# Patient Record
Sex: FEMALE | Race: WHITE | NOT HISPANIC OR LATINO | Employment: UNEMPLOYED | URBAN - NONMETROPOLITAN AREA
[De-identification: names, ages, dates, MRNs, and addresses within clinical notes are randomized per-mention and may not be internally consistent; named-entity substitution may affect disease eponyms.]

---

## 2018-06-12 ENCOUNTER — OFFICE VISIT (OUTPATIENT)
Dept: UROLOGY | Facility: CLINIC | Age: 68
End: 2018-06-12

## 2018-06-12 VITALS — HEIGHT: 63 IN | WEIGHT: 155 LBS | BODY MASS INDEX: 27.46 KG/M2

## 2018-06-12 DIAGNOSIS — N20.0 RENAL CALCULUS: Primary | ICD-10-CM

## 2018-06-12 PROCEDURE — 99204 OFFICE O/P NEW MOD 45 MIN: CPT | Performed by: UROLOGY

## 2018-06-12 RX ORDER — CETIRIZINE HYDROCHLORIDE 10 MG/1
10 TABLET ORAL DAILY
COMMUNITY

## 2018-06-12 RX ORDER — ATORVASTATIN CALCIUM 20 MG/1
40 TABLET, FILM COATED ORAL DAILY
Status: ON HOLD | COMMUNITY
End: 2020-06-08

## 2018-06-12 RX ORDER — PANTOPRAZOLE SODIUM 40 MG/1
40 TABLET, DELAYED RELEASE ORAL DAILY
COMMUNITY

## 2018-06-12 RX ORDER — RIZATRIPTAN BENZOATE 10 MG/1
10 TABLET ORAL ONCE AS NEEDED
COMMUNITY

## 2018-06-12 RX ORDER — MONTELUKAST SODIUM 10 MG/1
10 TABLET ORAL DAILY
COMMUNITY

## 2018-06-12 NOTE — PROGRESS NOTES
Chief Complaint:          Chief Complaint   Patient presents with   • Nephrolithiasis       HPI:   67 y.o. female.  67-year-old white female referred with bilateral stones seen on CT done for gastroesophageal reflux she is absolutely asymptomatic there was a positive family history.  She has apparently an elevated creatinine but it was not included.  She's having no other complaints or any other symptomatology referable to this problem.    Past Medical History:        Past Medical History:   Diagnosis Date   • Breast cancer    • H/O seasonal allergies    • High cholesterol    • Migraines    • Osteoarthritis          Current Meds:     Current Outpatient Prescriptions   Medication Sig Dispense Refill   • Alendronate Sodium (FOSAMAX PO) Take  by mouth.     • atorvastatin (LIPITOR) 20 MG tablet Take 20 mg by mouth Daily.     • CALCIUM-VITAMIN D PO Take  by mouth.     • cetirizine (zyrTEC) 10 MG tablet Take 10 mg by mouth Daily.     • DICLOFENAC SODIUM PO Take  by mouth.     • montelukast (SINGULAIR) 10 MG tablet Take 10 mg by mouth Every Night.     • pantoprazole (PROTONIX) 40 MG EC tablet Take 40 mg by mouth Daily.     • rizatriptan (MAXALT) 10 MG tablet Take 10 mg by mouth 1 (One) Time As Needed for Migraine. May repeat in 2 hours if needed     • SITagliptin (JANUVIA) 100 MG tablet Take 100 mg by mouth Daily.       No current facility-administered medications for this visit.         Allergies:      No Known Allergies     Past Surgical History:     Past Surgical History:   Procedure Laterality Date   • BREAST LUMPECTOMY     • GALLBLADDER SURGERY     • TUBAL ABDOMINAL LIGATION           Social History:     Social History     Social History   • Marital status:      Spouse name: N/A   • Number of children: N/A   • Years of education: N/A     Occupational History   • Not on file.     Social History Main Topics   • Smoking status: Former Smoker   • Smokeless tobacco: Never Used   • Alcohol use No   • Drug use: No   •  Sexual activity: Not on file     Other Topics Concern   • Not on file     Social History Narrative   • No narrative on file       Family History:     Family History   Problem Relation Age of Onset   • Heart disease Father    • Cancer Mother        Review of Systems:     Review of Systems   Constitutional: Negative.  Negative for activity change, appetite change, chills, diaphoresis, fatigue and unexpected weight change.   HENT: Negative for congestion, dental problem, drooling, ear discharge, ear pain, facial swelling, hearing loss, mouth sores, nosebleeds, postnasal drip, rhinorrhea, sinus pressure, sneezing, sore throat, tinnitus, trouble swallowing and voice change.    Eyes: Negative.  Negative for photophobia, pain, discharge, redness, itching and visual disturbance.   Respiratory: Negative.  Negative for apnea, cough, choking, chest tightness, shortness of breath, wheezing and stridor.    Cardiovascular: Negative.  Negative for chest pain, palpitations and leg swelling.   Gastrointestinal: Negative.  Negative for abdominal distention, abdominal pain, anal bleeding, blood in stool, constipation, diarrhea, nausea, rectal pain and vomiting.   Endocrine: Negative.  Negative for cold intolerance, heat intolerance, polydipsia, polyphagia and polyuria.   Musculoskeletal: Negative.  Negative for arthralgias, back pain, gait problem, joint swelling, myalgias, neck pain and neck stiffness.   Skin: Negative.  Negative for color change, pallor, rash and wound.   Allergic/Immunologic: Negative.  Negative for environmental allergies, food allergies and immunocompromised state.   Neurological: Negative.  Negative for dizziness, tremors, seizures, syncope, facial asymmetry, speech difficulty, weakness, light-headedness, numbness and headaches.   Hematological: Negative.  Negative for adenopathy. Does not bruise/bleed easily.   Psychiatric/Behavioral: Negative for agitation, behavioral problems, confusion, decreased  concentration, dysphoric mood, hallucinations, self-injury, sleep disturbance and suicidal ideas. The patient is not nervous/anxious and is not hyperactive.    All other systems reviewed and are negative.      Physical Exam:     Physical Exam   Constitutional: She appears well-developed and well-nourished.   HENT:   Head: Normocephalic and atraumatic.   Right Ear: External ear normal.   Left Ear: External ear normal.   Mouth/Throat: Oropharynx is clear and moist.   Eyes: Conjunctivae are normal. Pupils are equal, round, and reactive to light.   Cardiovascular: Normal rate, regular rhythm, normal heart sounds and intact distal pulses.    Pulmonary/Chest: Effort normal and breath sounds normal.   Abdominal: Soft. Bowel sounds are normal. She exhibits no distension and no mass. There is no tenderness. There is no rebound and no guarding.   Genitourinary: No vaginal discharge found.   Musculoskeletal: Normal range of motion.   Neurological: She is alert. She has normal reflexes.   Skin: Skin is warm and dry.   Psychiatric: She has a normal mood and affect. Her behavior is normal. Judgment and thought content normal.       I have reviewed the following portions of the patient's history: allergies, current medications, past family history, past medical history, past social history, past surgical history, problem list and ROS and confirm it's accurate.      Procedure:       Assessment/Plan:   Renal calculus-we discussed the presence of the stone we discussed the various therapeutic options available including percutaneous nephrostolithotomy, lithotripsy.  We discussed the risks of lithotripsy including the passage of stones the development of a large string of stones in the distal ureter known as Steinstrasse.  In the 3% incidence of that we will need to proceed with a ureteroscopy for obstructing fragments.  Extremely rare incidence of renal hematoma.  And the significance of this.  We discussed the absolute relative  indicators for intervention including the presence of sepsis, and pain we cannot control is the primary need for urgent intervention.  We discussed placement of a stent if indicated and the management of the stent as well.  It is a moderate stone burden but absolutely no indicators for intervention.  I'm do recommend observation with a KUB to determine the metabolic activity apparently there is a history of elevated creatinine but I have no documentation of this and I'll follow-up with him based on this     Patient's Body mass index is 27.46 kg/m². BMI is above normal parameters. Recommendations include: educational material.          This document has been electronically signed by NADER MOORE MD June 12, 2018 11:03 AM

## 2018-06-14 PROBLEM — N20.0 RENAL CALCULUS: Status: ACTIVE | Noted: 2018-06-14

## 2019-04-09 ENCOUNTER — HOSPITAL ENCOUNTER (OUTPATIENT)
Dept: GENERAL RADIOLOGY | Facility: HOSPITAL | Age: 69
Discharge: HOME | End: 2019-04-09
Admitting: UROLOGY

## 2019-04-09 ENCOUNTER — OFFICE VISIT (OUTPATIENT)
Dept: UROLOGY | Facility: CLINIC | Age: 69
End: 2019-04-09

## 2019-04-09 VITALS — BODY MASS INDEX: 27.46 KG/M2 | WEIGHT: 155 LBS | HEIGHT: 63 IN

## 2019-04-09 DIAGNOSIS — N20.0 RENAL CALCULUS: Primary | ICD-10-CM

## 2019-04-09 DIAGNOSIS — N20.0 RENAL CALCULUS: ICD-10-CM

## 2019-04-09 PROCEDURE — 74018 RADEX ABDOMEN 1 VIEW: CPT

## 2019-04-09 PROCEDURE — 74018 RADEX ABDOMEN 1 VIEW: CPT | Performed by: RADIOLOGY

## 2019-04-09 PROCEDURE — 99213 OFFICE O/P EST LOW 20 MIN: CPT | Performed by: UROLOGY

## 2019-04-09 NOTE — PROGRESS NOTES
Chief Complaint:          Chief Complaint   Patient presents with   • Nephrolithiasis     f/u       HPI:   68 y.o. female  referred with bilateral stones seen on CT done for gastroesophageal reflux she is absolutely asymptomatic there was a positive family history.  She has apparently an elevated creatinine but it was not included.  She's having no other complaints or any other symptomatology referable to this problem.  He returns today she feels great she is doing well she has no chills no fever she has bilateral dull aches right greater than left I would get a repeat CT scan as it has been over a year and will redefine this for her and I will follow-up with her based on this.  Obviously, she had no pain but now she is having significant flank pain and I think it is important to rule her in or out active stone disease      Past Medical History:        Past Medical History:   Diagnosis Date   • Breast cancer (CMS/HCC)    • H/O seasonal allergies    • High cholesterol    • Migraines    • Osteoarthritis          Current Meds:     Current Outpatient Medications   Medication Sig Dispense Refill   • Alendronate Sodium (FOSAMAX PO) Take  by mouth.     • atorvastatin (LIPITOR) 20 MG tablet Take 20 mg by mouth Daily.     • CALCIUM-VITAMIN D PO Take  by mouth.     • cetirizine (zyrTEC) 10 MG tablet Take 10 mg by mouth Daily.     • DICLOFENAC SODIUM PO Take  by mouth.     • montelukast (SINGULAIR) 10 MG tablet Take 10 mg by mouth Every Night.     • pantoprazole (PROTONIX) 40 MG EC tablet Take 40 mg by mouth Daily.     • rizatriptan (MAXALT) 10 MG tablet Take 10 mg by mouth 1 (One) Time As Needed for Migraine. May repeat in 2 hours if needed     • SITagliptin (JANUVIA) 100 MG tablet Take 100 mg by mouth Daily.       No current facility-administered medications for this visit.         Allergies:      No Known Allergies     Past Surgical History:     Past Surgical History:   Procedure Laterality Date   • BREAST LUMPECTOMY     •  GALLBLADDER SURGERY     • TUBAL ABDOMINAL LIGATION           Social History:     Social History     Socioeconomic History   • Marital status:      Spouse name: Not on file   • Number of children: Not on file   • Years of education: Not on file   • Highest education level: Not on file   Tobacco Use   • Smoking status: Former Smoker   • Smokeless tobacco: Never Used   Substance and Sexual Activity   • Alcohol use: No   • Drug use: No       Family History:     Family History   Problem Relation Age of Onset   • Heart disease Father    • Cancer Mother        Review of Systems:     Review of Systems   Constitutional: Negative.  Negative for activity change, appetite change, chills, diaphoresis, fatigue and unexpected weight change.   HENT: Negative for congestion, dental problem, drooling, ear discharge, ear pain, facial swelling, hearing loss, mouth sores, nosebleeds, postnasal drip, rhinorrhea, sinus pressure, sneezing, sore throat, tinnitus, trouble swallowing and voice change.    Eyes: Negative.  Negative for photophobia, pain, discharge, redness, itching and visual disturbance.   Respiratory: Negative.  Negative for apnea, cough, choking, chest tightness, shortness of breath, wheezing and stridor.    Cardiovascular: Negative.  Negative for chest pain, palpitations and leg swelling.   Gastrointestinal: Negative.  Negative for abdominal distention, abdominal pain, anal bleeding, blood in stool, constipation, diarrhea, nausea, rectal pain and vomiting.   Endocrine: Negative.  Negative for cold intolerance, heat intolerance, polydipsia, polyphagia and polyuria.   Genitourinary: Positive for flank pain.   Musculoskeletal: Negative for arthralgias, back pain, gait problem, joint swelling, myalgias, neck pain and neck stiffness.   Skin: Negative.  Negative for color change, pallor, rash and wound.   Allergic/Immunologic: Negative.  Negative for environmental allergies, food allergies and immunocompromised state.    Neurological: Negative.  Negative for dizziness, tremors, seizures, syncope, facial asymmetry, speech difficulty, weakness, light-headedness, numbness and headaches.   Hematological: Negative.  Negative for adenopathy. Does not bruise/bleed easily.   Psychiatric/Behavioral: Negative for agitation, behavioral problems, confusion, decreased concentration, dysphoric mood, hallucinations, self-injury, sleep disturbance and suicidal ideas. The patient is not nervous/anxious and is not hyperactive.    All other systems reviewed and are negative.      Physical Exam:     Physical Exam   Constitutional: She appears well-developed and well-nourished.   HENT:   Head: Normocephalic and atraumatic.   Right Ear: External ear normal.   Left Ear: External ear normal.   Mouth/Throat: Oropharynx is clear and moist.   Eyes: Conjunctivae are normal. Pupils are equal, round, and reactive to light.   Cardiovascular: Normal rate, regular rhythm, normal heart sounds and intact distal pulses.   Pulmonary/Chest: Effort normal and breath sounds normal.   Abdominal: Soft. Bowel sounds are normal. She exhibits no distension and no mass. There is no tenderness. There is no rebound and no guarding.   Genitourinary: No vaginal discharge found.   Musculoskeletal: Normal range of motion.   Neurological: She is alert. She has normal reflexes.   Skin: Skin is warm and dry.   Psychiatric: She has a normal mood and affect. Her behavior is normal. Judgment and thought content normal.       I have reviewed the following portions of the patient's history: allergies, current medications, past family history, past medical history, past social history, past surgical history, problem list and ROS and confirm it's accurate.      Procedure:       Assessment/Plan:   Renal calculus-we discussed the presence of the stone we discussed the various therapeutic options available including percutaneous nephrostolithotomy, lithotripsy.  We discussed the risks of  lithotripsy including the passage of stones the development of a large string of stones in the distal ureter known as Steinstrasse.  In the 3% incidence of that we will need to proceed with a ureteroscopy for obstructing fragments.  Extremely rare incidence of renal hematoma.  And the significance of this.  We discussed the absolute relative indicators for intervention including the presence of sepsis, and pain we cannot control is the primary need for urgent intervention.  We discussed placement of a stent if indicated and the management of the stent as well.  Would repeat the CT scan this is been over a year and she is now having significant symptomatology    Patient's Body mass index is 27.46 kg/m². BMI is above normal parameters. Recommendations include: educational material.          This document has been electronically signed by NADER MOORE MD April 9, 2019 3:14 PM

## 2019-04-15 ENCOUNTER — HOSPITAL ENCOUNTER (OUTPATIENT)
Dept: CT IMAGING | Facility: HOSPITAL | Age: 69
Discharge: HOME | End: 2019-04-15

## 2019-04-15 DIAGNOSIS — N20.0 RENAL CALCULUS: ICD-10-CM

## 2019-04-22 ENCOUNTER — HOSPITAL ENCOUNTER (OUTPATIENT)
Dept: CT IMAGING | Facility: HOSPITAL | Age: 69
Discharge: HOME | End: 2019-04-22
Admitting: UROLOGY

## 2019-04-22 PROCEDURE — 74176 CT ABD & PELVIS W/O CONTRAST: CPT

## 2019-04-22 PROCEDURE — 74176 CT ABD & PELVIS W/O CONTRAST: CPT | Performed by: RADIOLOGY

## 2019-04-23 ENCOUNTER — OFFICE VISIT (OUTPATIENT)
Dept: UROLOGY | Facility: CLINIC | Age: 69
End: 2019-04-23

## 2019-04-23 VITALS — HEIGHT: 63 IN | BODY MASS INDEX: 27.46 KG/M2 | WEIGHT: 155 LBS

## 2019-04-23 DIAGNOSIS — N20.0 RENAL CALCULUS: Primary | ICD-10-CM

## 2019-04-23 PROCEDURE — 99213 OFFICE O/P EST LOW 20 MIN: CPT | Performed by: UROLOGY

## 2019-04-23 RX ORDER — GENTAMICIN SULFATE 80 MG/100ML
80 INJECTION, SOLUTION INTRAVENOUS ONCE
Status: CANCELLED | OUTPATIENT
Start: 2019-04-23 | End: 2019-04-23

## 2019-04-23 NOTE — PROGRESS NOTES
Chief Complaint:          Chief Complaint   Patient presents with   • Nephrolithiasis     f/u review CT       HPI:   68 y.o. female.  68-year-old white female with a significant stone burden and a proximal right sided stone I am to set her up for appropriate lithotripsy.  Renal calculus-we discussed the presence of the stone we discussed the various therapeutic options available including percutaneous nephrostolithotomy, lithotripsy.  We discussed the risks of lithotripsy including the passage of stones the development of a large string of stones in the distal ureter known as Steinstrasse.  In the 3% incidence of that we will need to proceed with a ureteroscopy for obstructing fragments.  Extremely rare incidence of renal hematoma.  And the significance of this.  We discussed the absolute relative indicators for intervention including the presence of sepsis, and pain we cannot control is the primary need for urgent intervention.  We discussed placement of a stent if indicated and the management of the stent as well.  ESWL-the patient is a candidate for extracorporeal shockwave lithotripsy.  We discussed the type of stone.  And the complications associated with the procedure including but not limited to pain in the flank, hematoma, spontaneous renal hemorrhage, and adequate fragmentation of stones.  The need for passage of the stones.  The need for concomitant additional procedures in the range of 24% the risk of a distal fragment in the range of 3% requiring ureteroscopic removal..       Past Medical History:        Past Medical History:   Diagnosis Date   • Breast cancer (CMS/HCC)    • H/O seasonal allergies    • High cholesterol    • Migraines    • Osteoarthritis          Current Meds:     Current Outpatient Medications   Medication Sig Dispense Refill   • Alendronate Sodium (FOSAMAX PO) Take  by mouth.     • atorvastatin (LIPITOR) 20 MG tablet Take 20 mg by mouth Daily.     • CALCIUM-VITAMIN D PO Take  by mouth.      • cetirizine (zyrTEC) 10 MG tablet Take 10 mg by mouth Daily.     • DICLOFENAC SODIUM PO Take  by mouth.     • montelukast (SINGULAIR) 10 MG tablet Take 10 mg by mouth Every Night.     • pantoprazole (PROTONIX) 40 MG EC tablet Take 40 mg by mouth Daily.     • rizatriptan (MAXALT) 10 MG tablet Take 10 mg by mouth 1 (One) Time As Needed for Migraine. May repeat in 2 hours if needed     • SITagliptin (JANUVIA) 100 MG tablet Take 100 mg by mouth Daily.       No current facility-administered medications for this visit.         Allergies:      No Known Allergies     Past Surgical History:     Past Surgical History:   Procedure Laterality Date   • BREAST LUMPECTOMY     • GALLBLADDER SURGERY     • TUBAL ABDOMINAL LIGATION           Social History:     Social History     Socioeconomic History   • Marital status:      Spouse name: Not on file   • Number of children: Not on file   • Years of education: Not on file   • Highest education level: Not on file   Tobacco Use   • Smoking status: Former Smoker   • Smokeless tobacco: Never Used   Substance and Sexual Activity   • Alcohol use: No   • Drug use: No       Family History:     Family History   Problem Relation Age of Onset   • Heart disease Father    • Cancer Mother        Review of Systems:     Review of Systems   Constitutional: Negative.  Negative for activity change, appetite change, chills, diaphoresis, fatigue and unexpected weight change.   HENT: Negative for congestion, dental problem, drooling, ear discharge, ear pain, facial swelling, hearing loss, mouth sores, nosebleeds, postnasal drip, rhinorrhea, sinus pressure, sneezing, sore throat, tinnitus, trouble swallowing and voice change.    Eyes: Negative.  Negative for photophobia, pain, discharge, redness, itching and visual disturbance.   Respiratory: Negative.  Negative for apnea, cough, choking, chest tightness, shortness of breath, wheezing and stridor.    Cardiovascular: Negative.  Negative for chest  pain, palpitations and leg swelling.   Gastrointestinal: Negative.  Negative for abdominal distention, abdominal pain, anal bleeding, blood in stool, constipation, diarrhea, nausea, rectal pain and vomiting.   Endocrine: Negative.  Negative for cold intolerance, heat intolerance, polydipsia, polyphagia and polyuria.   Genitourinary: Positive for flank pain.   Musculoskeletal: Negative for arthralgias, back pain, gait problem, joint swelling, myalgias, neck pain and neck stiffness.   Skin: Negative.  Negative for color change, pallor, rash and wound.   Allergic/Immunologic: Negative.  Negative for environmental allergies, food allergies and immunocompromised state.   Neurological: Negative.  Negative for dizziness, tremors, seizures, syncope, facial asymmetry, speech difficulty, weakness, light-headedness, numbness and headaches.   Hematological: Negative.  Negative for adenopathy. Does not bruise/bleed easily.   Psychiatric/Behavioral: Negative for agitation, behavioral problems, confusion, decreased concentration, dysphoric mood, hallucinations, self-injury, sleep disturbance and suicidal ideas. The patient is not nervous/anxious and is not hyperactive.    All other systems reviewed and are negative.      Physical Exam:     Physical Exam   Constitutional: She appears well-developed and well-nourished.   HENT:   Head: Normocephalic and atraumatic.   Right Ear: External ear normal.   Left Ear: External ear normal.   Mouth/Throat: Oropharynx is clear and moist.   Eyes: Conjunctivae are normal. Pupils are equal, round, and reactive to light.   Cardiovascular: Normal rate, regular rhythm, normal heart sounds and intact distal pulses.   Pulmonary/Chest: Effort normal and breath sounds normal.   Abdominal: Soft. Bowel sounds are normal. She exhibits no distension and no mass. There is no tenderness. There is no rebound and no guarding.   Genitourinary: No vaginal discharge found.   Musculoskeletal: Normal range of motion.    Neurological: She is alert. She has normal reflexes.   Skin: Skin is warm and dry.   Psychiatric: She has a normal mood and affect. Her behavior is normal. Judgment and thought content normal.       I have reviewed the following portions of the patient's history: allergies, current medications, past family history, past medical history, past social history, past surgical history, problem list and ROS and confirm it's accurate.      Procedure:       Assessment/Plan:   Renal calculus-we discussed the presence of the stone we discussed the various therapeutic options available including percutaneous nephrostolithotomy, lithotripsy.  We discussed the risks of lithotripsy including the passage of stones the development of a large string of stones in the distal ureter known as Steinstrasse.  In the 3% incidence of that we will need to proceed with a ureteroscopy for obstructing fragments.  Extremely rare incidence of renal hematoma.  And the significance of this.  We discussed the absolute relative indicators for intervention including the presence of sepsis, and pain we cannot control is the primary need for urgent intervention.  We discussed placement of a stent if indicated and the management of the stent as well.  For lithotripsy    Patient's Body mass index is 27.46 kg/m². BMI is above normal parameters. Recommendations include: educational material.          This document has been electronically signed by NADER MOORE MD April 23, 2019 2:52 PM

## 2019-05-13 ENCOUNTER — APPOINTMENT (OUTPATIENT)
Dept: PREADMISSION TESTING | Facility: HOSPITAL | Age: 69
End: 2019-05-13

## 2019-05-13 LAB
ANION GAP SERPL CALCULATED.3IONS-SCNC: 14.2 MMOL/L
BUN BLD-MCNC: 23 MG/DL (ref 8–23)
BUN/CREAT SERPL: 15 (ref 7–25)
CALCIUM SPEC-SCNC: 9.8 MG/DL (ref 8.6–10.5)
CHLORIDE SERPL-SCNC: 102 MMOL/L (ref 98–107)
CO2 SERPL-SCNC: 22.8 MMOL/L (ref 22–29)
CREAT BLD-MCNC: 1.53 MG/DL (ref 0.57–1)
DEPRECATED RDW RBC AUTO: 43.3 FL (ref 37–54)
ERYTHROCYTE [DISTWIDTH] IN BLOOD BY AUTOMATED COUNT: 13.1 % (ref 12.3–15.4)
GFR SERPL CREATININE-BSD FRML MDRD: 34 ML/MIN/1.73
GLUCOSE BLD-MCNC: 160 MG/DL (ref 65–99)
HCT VFR BLD AUTO: 43.5 % (ref 34–46.6)
HGB BLD-MCNC: 14.5 G/DL (ref 12–15.9)
MCH RBC QN AUTO: 30.5 PG (ref 26.6–33)
MCHC RBC AUTO-ENTMCNC: 33.3 G/DL (ref 31.5–35.7)
MCV RBC AUTO: 91.6 FL (ref 79–97)
PLATELET # BLD AUTO: 192 10*3/MM3 (ref 140–450)
PMV BLD AUTO: 11.1 FL (ref 6–12)
POTASSIUM BLD-SCNC: 4.1 MMOL/L (ref 3.5–5.2)
RBC # BLD AUTO: 4.75 10*6/MM3 (ref 3.77–5.28)
SODIUM BLD-SCNC: 139 MMOL/L (ref 136–145)
WBC NRBC COR # BLD: 8.56 10*3/MM3 (ref 3.4–10.8)

## 2019-05-13 PROCEDURE — 80048 BASIC METABOLIC PNL TOTAL CA: CPT | Performed by: ANESTHESIOLOGY

## 2019-05-13 PROCEDURE — 36415 COLL VENOUS BLD VENIPUNCTURE: CPT

## 2019-05-13 PROCEDURE — 85027 COMPLETE CBC AUTOMATED: CPT | Performed by: ANESTHESIOLOGY

## 2019-05-13 RX ORDER — ACETAMINOPHEN, ASPIRIN AND CAFFEINE 250; 250; 65 MG/1; MG/1; MG/1
1 TABLET, FILM COATED ORAL EVERY 6 HOURS PRN
COMMUNITY
End: 2020-06-09 | Stop reason: HOSPADM

## 2019-05-13 RX ORDER — FLUTICASONE PROPIONATE 50 MCG
2 SPRAY, SUSPENSION (ML) NASAL DAILY PRN
COMMUNITY

## 2019-05-13 RX ORDER — TOPIRAMATE 50 MG/1
50 TABLET, FILM COATED ORAL 2 TIMES DAILY
Status: ON HOLD | COMMUNITY
End: 2020-06-08

## 2019-05-13 RX ORDER — POLYETHYLENE GLYCOL 3350 17 G/17G
17 POWDER, FOR SOLUTION ORAL DAILY PRN
COMMUNITY

## 2019-05-13 NOTE — DISCHARGE INSTRUCTIONS
5/17/2019   ARRIVAL TIME PER DR HAHN    TAKE the following medications the morning of surgery:  All heart or blood pressure medications    HOLD all diabetic medications the morning of surgery as ordered by physician.    Please discontinue all blood thinners and anticoagulants (except aspirin) prior to surgery as per your surgeon and cardiologist instructions.  Aspirin may be continued up to the day prior to surgery.    General Instructions:  · Do not eat or drink after midnight: includes water, mints, or gum. You may brush your teeth.  Dental appliances that are removable must be taken out day of surgery.  · Do not smoke, chew tobacco, or drink alcohol.  · Bring medications in original bottles, any inhalers and if applicable your C-PAP/BI-PAP machine.  · Bring any papers given to you in the doctor's office.  · Wear clean comfortable clothes and socks.  · Do not wear contact lenses or make-up. Bring a case for your glasses if applicable.  · Bring crutches or walker if applicable.  · Leave all other valuables and jewelry at home.    If you were given a blood bank ID arm band remember to bring it with you the day of surgery.    Preventing a Surgical Site Infection:  Shower the night before surgery (unless instructed other wise) using a fresh bar of anti-bacterial soap (such as Dial) and clean washcloth. Dry with a clean towel and dress in clean clothing.  For 2 to 3 days before surgery, avoid shaving with a razor near where you will have surgery because the razor can irritate skin and make it easier to develop an infection. Ask your surgeon if you will be receiving antibiotics prior to surgery.  Make sure you, your family, and all healthcare providers clear their hands with soap and water or an alcohol-based hand  before caring for you or your wound.  If at all possible, quit smoking as many days before surgery as you can.    Day of surgery:  Upon arrival, a Pre-op nurse and Anesthesiologist will review your  health history, obtain vital signs, and answer questions you may have. The only belongings needed at this time will be your home medications and if applicable your C-PAP/BI-PAP machine. If you are staying overnight your family can leave the rest of your belongings in the car and bring them to your room later. A Pre-op nurse will start an IV and you may receive medication in preparation for surgery, including something to help you relax. Your family will be able to see you in the Pre-op area. While you are in surgery your family should notify the waiting room  if they leave the waiting room area and provide a contact phone number.    Please be aware that surgery does come with discomfort. We want to make every effort to control your discomfort so please discuss any uncontrolled symptoms with your nurse. Your doctor will most likely have prescribed pain medications.    If you are going home after surgery you will receive individualized written care instructions before being discharged. A responsible adult must drive you to and from the hospital on the day of surgery and stay with you for 24 hours.    If you are staying overnight following surgery, you will be transported to your hospital room following the recovery period.  [facility] has all private rooms.    If you have any questions please call Pre-Admission Testing at [phone].  Deductibles and co-payments are collected on the day of service. Please be prepared to pay the required co-pay, deductible or deposit on the day of service as defined by your plan.

## 2019-05-17 ENCOUNTER — ANESTHESIA EVENT (OUTPATIENT)
Dept: PERIOP | Facility: HOSPITAL | Age: 69
End: 2019-05-17

## 2019-05-17 ENCOUNTER — HOSPITAL ENCOUNTER (OUTPATIENT)
Facility: HOSPITAL | Age: 69
Discharge: HOME | End: 2019-05-17
Attending: UROLOGY | Admitting: UROLOGY

## 2019-05-17 ENCOUNTER — ANESTHESIA (OUTPATIENT)
Dept: PERIOP | Facility: HOSPITAL | Age: 69
End: 2019-05-17

## 2019-05-17 VITALS
HEIGHT: 63 IN | TEMPERATURE: 98 F | HEART RATE: 66 BPM | WEIGHT: 163.8 LBS | RESPIRATION RATE: 17 BRPM | OXYGEN SATURATION: 99 % | SYSTOLIC BLOOD PRESSURE: 146 MMHG | DIASTOLIC BLOOD PRESSURE: 88 MMHG | BODY MASS INDEX: 29.02 KG/M2

## 2019-05-17 DIAGNOSIS — N20.0 RENAL CALCULUS: ICD-10-CM

## 2019-05-17 LAB — GLUCOSE BLDC GLUCOMTR-MCNC: 161 MG/DL (ref 70–130)

## 2019-05-17 PROCEDURE — 25010000002 ONDANSETRON PER 1 MG: Performed by: NURSE ANESTHETIST, CERTIFIED REGISTERED

## 2019-05-17 PROCEDURE — 82962 GLUCOSE BLOOD TEST: CPT

## 2019-05-17 PROCEDURE — 25010000002 GENTAMICIN PER 80 MG: Performed by: UROLOGY

## 2019-05-17 PROCEDURE — 50590 FRAGMENTING OF KIDNEY STONE: CPT | Performed by: UROLOGY

## 2019-05-17 PROCEDURE — 25010000002 PROPOFOL 10 MG/ML EMULSION: Performed by: NURSE ANESTHETIST, CERTIFIED REGISTERED

## 2019-05-17 PROCEDURE — 25010000002 FENTANYL CITRATE (PF) 100 MCG/2ML SOLUTION: Performed by: NURSE ANESTHETIST, CERTIFIED REGISTERED

## 2019-05-17 RX ORDER — SODIUM CHLORIDE, SODIUM LACTATE, POTASSIUM CHLORIDE, CALCIUM CHLORIDE 600; 310; 30; 20 MG/100ML; MG/100ML; MG/100ML; MG/100ML
125 INJECTION, SOLUTION INTRAVENOUS CONTINUOUS
Status: DISCONTINUED | OUTPATIENT
Start: 2019-05-17 | End: 2019-05-17 | Stop reason: HOSPADM

## 2019-05-17 RX ORDER — MIDAZOLAM HYDROCHLORIDE 1 MG/ML
1 INJECTION INTRAMUSCULAR; INTRAVENOUS
Status: DISCONTINUED | OUTPATIENT
Start: 2019-05-17 | End: 2019-05-17 | Stop reason: HOSPADM

## 2019-05-17 RX ORDER — SODIUM CHLORIDE 0.9 % (FLUSH) 0.9 %
3-10 SYRINGE (ML) INJECTION AS NEEDED
Status: DISCONTINUED | OUTPATIENT
Start: 2019-05-17 | End: 2019-05-17 | Stop reason: HOSPADM

## 2019-05-17 RX ORDER — ONDANSETRON 2 MG/ML
INJECTION INTRAMUSCULAR; INTRAVENOUS AS NEEDED
Status: DISCONTINUED | OUTPATIENT
Start: 2019-05-17 | End: 2019-05-17 | Stop reason: SURG

## 2019-05-17 RX ORDER — OXYCODONE HYDROCHLORIDE AND ACETAMINOPHEN 5; 325 MG/1; MG/1
1 TABLET ORAL ONCE AS NEEDED
Status: DISCONTINUED | OUTPATIENT
Start: 2019-05-17 | End: 2019-05-17 | Stop reason: HOSPADM

## 2019-05-17 RX ORDER — MEPERIDINE HYDROCHLORIDE 25 MG/ML
12.5 INJECTION INTRAMUSCULAR; INTRAVENOUS; SUBCUTANEOUS
Status: DISCONTINUED | OUTPATIENT
Start: 2019-05-17 | End: 2019-05-17 | Stop reason: HOSPADM

## 2019-05-17 RX ORDER — FENTANYL CITRATE 50 UG/ML
50 INJECTION, SOLUTION INTRAMUSCULAR; INTRAVENOUS
Status: DISCONTINUED | OUTPATIENT
Start: 2019-05-17 | End: 2019-05-17 | Stop reason: HOSPADM

## 2019-05-17 RX ORDER — SODIUM CHLORIDE 0.9 % (FLUSH) 0.9 %
3 SYRINGE (ML) INJECTION EVERY 12 HOURS SCHEDULED
Status: DISCONTINUED | OUTPATIENT
Start: 2019-05-17 | End: 2019-05-17 | Stop reason: HOSPADM

## 2019-05-17 RX ORDER — IPRATROPIUM BROMIDE AND ALBUTEROL SULFATE 2.5; .5 MG/3ML; MG/3ML
3 SOLUTION RESPIRATORY (INHALATION) ONCE AS NEEDED
Status: DISCONTINUED | OUTPATIENT
Start: 2019-05-17 | End: 2019-05-17 | Stop reason: HOSPADM

## 2019-05-17 RX ORDER — GENTAMICIN SULFATE 80 MG/100ML
80 INJECTION, SOLUTION INTRAVENOUS ONCE
Status: COMPLETED | OUTPATIENT
Start: 2019-05-17 | End: 2019-05-17

## 2019-05-17 RX ORDER — FAMOTIDINE 10 MG/ML
INJECTION, SOLUTION INTRAVENOUS AS NEEDED
Status: DISCONTINUED | OUTPATIENT
Start: 2019-05-17 | End: 2019-05-17 | Stop reason: SURG

## 2019-05-17 RX ORDER — ONDANSETRON 2 MG/ML
4 INJECTION INTRAMUSCULAR; INTRAVENOUS ONCE AS NEEDED
Status: DISCONTINUED | OUTPATIENT
Start: 2019-05-17 | End: 2019-05-17 | Stop reason: HOSPADM

## 2019-05-17 RX ORDER — PROPOFOL 10 MG/ML
VIAL (ML) INTRAVENOUS AS NEEDED
Status: DISCONTINUED | OUTPATIENT
Start: 2019-05-17 | End: 2019-05-17 | Stop reason: SURG

## 2019-05-17 RX ORDER — MIDAZOLAM HYDROCHLORIDE 1 MG/ML
2 INJECTION INTRAMUSCULAR; INTRAVENOUS
Status: DISCONTINUED | OUTPATIENT
Start: 2019-05-17 | End: 2019-05-17 | Stop reason: HOSPADM

## 2019-05-17 RX ORDER — FENTANYL CITRATE 50 UG/ML
INJECTION, SOLUTION INTRAMUSCULAR; INTRAVENOUS AS NEEDED
Status: DISCONTINUED | OUTPATIENT
Start: 2019-05-17 | End: 2019-05-17 | Stop reason: SURG

## 2019-05-17 RX ORDER — HYDROCODONE BITARTRATE AND ACETAMINOPHEN 10; 325 MG/1; MG/1
1 TABLET ORAL EVERY 4 HOURS PRN
Qty: 12 TABLET | Refills: 0 | Status: ON HOLD | OUTPATIENT
Start: 2019-05-17 | End: 2020-06-08

## 2019-05-17 RX ADMIN — PROPOFOL 70 MG: 10 INJECTION, EMULSION INTRAVENOUS at 11:46

## 2019-05-17 RX ADMIN — SODIUM CHLORIDE, POTASSIUM CHLORIDE, SODIUM LACTATE AND CALCIUM CHLORIDE 125 ML/HR: 600; 310; 30; 20 INJECTION, SOLUTION INTRAVENOUS at 11:34

## 2019-05-17 RX ADMIN — GENTAMICIN SULFATE 80 MG: 80 INJECTION, SOLUTION INTRAVENOUS at 11:44

## 2019-05-17 RX ADMIN — ONDANSETRON 4 MG: 2 INJECTION, SOLUTION INTRAMUSCULAR; INTRAVENOUS at 11:46

## 2019-05-17 RX ADMIN — SODIUM CHLORIDE, POTASSIUM CHLORIDE, SODIUM LACTATE AND CALCIUM CHLORIDE: 600; 310; 30; 20 INJECTION, SOLUTION INTRAVENOUS at 11:40

## 2019-05-17 RX ADMIN — FENTANYL CITRATE 50 MCG: 50 INJECTION INTRAMUSCULAR; INTRAVENOUS at 11:59

## 2019-05-17 RX ADMIN — FAMOTIDINE 20 MG: 10 INJECTION, SOLUTION INTRAVENOUS at 11:46

## 2019-05-17 RX ADMIN — FENTANYL CITRATE 50 MCG: 50 INJECTION INTRAMUSCULAR; INTRAVENOUS at 11:49

## 2019-05-17 NOTE — ANESTHESIA PREPROCEDURE EVALUATION
Anesthesia Evaluation     no history of anesthetic complications:  NPO Solid Status: > 8 hours  NPO Liquid Status: > 8 hours           Airway   Mallampati: II  TM distance: >3 FB  Neck ROM: full  No difficulty expected  Dental    (+) edentulous    Pulmonary - normal exam   Cardiovascular - normal exam    (+) hyperlipidemia,       Neuro/Psych  GI/Hepatic/Renal/Endo    (+)  GERD,  renal disease stones, diabetes mellitus,     Musculoskeletal     Abdominal  - normal exam   Substance History      OB/GYN          Other                        Anesthesia Plan    ASA 2     general     intravenous induction   Anesthetic plan, all risks, benefits, and alternatives have been provided, discussed and informed consent has been obtained with: patient.

## 2019-05-17 NOTE — ANESTHESIA POSTPROCEDURE EVALUATION
Patient: Laxmi Higginbotham    Procedure Summary     Date:  05/17/19 Room / Location:  [facility] OR  /  [facility] OR    Anesthesia Start:  1143 Anesthesia Stop:  1214    Procedure:  EXTRACORPOREAL SHOCKWAVE LITHOTRIPSY (Right ) Diagnosis:       Renal calculus      (Renal calculus [N20.0])    Surgeon:  Darrick Alexis MD Provider:  Kyle Vazquez MD    Anesthesia Type:  general ASA Status:  2          Anesthesia Type: general  Last vitals  BP   147/79 (05/17/19 1220)   Temp   97.2 °F (36.2 °C) (05/17/19 1215)   Pulse   66 (05/17/19 1220)   Resp   20 (05/17/19 1220)     SpO2   100 % (05/17/19 1220)     Post Anesthesia Care and Evaluation    Patient location during evaluation: bedside  Patient participation: complete - patient participated  Level of consciousness: awake and alert  Pain score: 1  Pain management: adequate  Airway patency: patent  Anesthetic complications: No anesthetic complications  PONV Status: none  Cardiovascular status: acceptable  Respiratory status: acceptable  Hydration status: acceptable

## 2019-05-17 NOTE — ANESTHESIA PROCEDURE NOTES
Airway  Urgency: elective    Date/Time: 5/17/2019 11:51 AM  Airway not difficult    General Information and Staff    Patient location during procedure: OR  Anesthesiologist: Kyle Vazquez MD  CRNA: Shanice Murry CRNA    Indications and Patient Condition  Indications for airway management: airway protection    Preoxygenated: yes  Mask difficulty assessment: 0 - not attempted    Final Airway Details  Final airway type: supraglottic airway      Successful airway: unique  Size 4    Number of attempts at approach: 1

## 2019-05-17 NOTE — OP NOTE
EXTRACORPOREAL SHOCKWAVE LITHOTRIPSY  Procedure Note    Laxmi Higginbotham  5/17/2019    Pre-op Diagnosis:   Renal calculus [N20.0]    Post-op Diagnosis:     Post-Op Diagnosis Codes:     * Renal calculus [N20.0]    Procedure/CPT® Codes:    68-year-old white female with multiple bilateral calyceal stones but a painful right 5 mm upper ureteral calculus.  ESWL-the patient is a candidate for extracorporeal shockwave lithotripsy.  We discussed the type of stone.  And the complications associated with the procedure including but not limited to pain in the flank, hematoma, spontaneous renal hemorrhage, and adequate fragmentation of stones.  The need for passage of the stones.  The need for concomitant additional procedures in the range of 24% the risk of a distal fragment in the range of 3% requiring ureteroscopic removal..  Fact that sometimes a stent is indicated based on the size and the density of the stone as determined on the CAT scan.  Following an informed consent he is brought to the operative suite and underwent induction of general endotracheal anesthetic the stone was localized at F2 and a total of 2000 shockwaves administered without complication.  There was excellent fragmentation he was awake and alert return to recovery room        Procedure(s):  EXTRACORPOREAL SHOCKWAVE LITHOTRIPSY    Surgeon(s):  Darrick Alexis MD    Anesthesia: see anesthesia record    Staff:   Circulator: Nahun Pereira RN  Other: Sherice Mcginnis    Estimated Blood Loss: none  Urine Voided: * No values recorded between 5/17/2019 11:43 AM and 5/17/2019 12:03 PM *    Specimens:                None      Drains: None    Findings: Good fragmentation    Blood: N/A    Complications: None    Grafts and Implants: None    Darrick Alexis MD     Date: 5/17/2019  Time: 12:03 PM

## 2020-06-08 ENCOUNTER — ANESTHESIA EVENT (OUTPATIENT)
Dept: PERIOP | Facility: HOSPITAL | Age: 70
End: 2020-06-08

## 2020-06-08 ENCOUNTER — ANESTHESIA (OUTPATIENT)
Dept: PERIOP | Facility: HOSPITAL | Age: 70
End: 2020-06-08

## 2020-06-08 ENCOUNTER — APPOINTMENT (OUTPATIENT)
Dept: GENERAL RADIOLOGY | Facility: HOSPITAL | Age: 70
End: 2020-06-08

## 2020-06-08 ENCOUNTER — HOSPITAL ENCOUNTER (INPATIENT)
Facility: HOSPITAL | Age: 70
LOS: 1 days | Discharge: HOME | End: 2020-06-09
Attending: INTERNAL MEDICINE | Admitting: UROLOGY

## 2020-06-08 DIAGNOSIS — N20.0 NEPHROLITHIASIS: Primary | ICD-10-CM

## 2020-06-08 PROBLEM — N20.1 URETEROLITHIASIS: Status: ACTIVE | Noted: 2020-06-08

## 2020-06-08 PROBLEM — E78.5 HYPERLIPIDEMIA: Chronic | Status: ACTIVE | Noted: 2020-06-08

## 2020-06-08 PROBLEM — K21.9 GERD (GASTROESOPHAGEAL REFLUX DISEASE): Chronic | Status: ACTIVE | Noted: 2020-06-08

## 2020-06-08 PROBLEM — N23 RENAL COLIC: Status: ACTIVE | Noted: 2020-06-08

## 2020-06-08 PROBLEM — E11.9 TYPE 2 DIABETES MELLITUS, WITHOUT LONG-TERM CURRENT USE OF INSULIN (HCC): Chronic | Status: ACTIVE | Noted: 2020-06-08

## 2020-06-08 PROBLEM — Z85.3 HISTORY OF BREAST CANCER: Chronic | Status: ACTIVE | Noted: 2020-06-08

## 2020-06-08 PROBLEM — N17.9 AKI (ACUTE KIDNEY INJURY) (HCC): Status: ACTIVE | Noted: 2020-06-08

## 2020-06-08 PROBLEM — Z87.442 HISTORY OF NEPHROLITHIASIS: Chronic | Status: ACTIVE | Noted: 2020-06-08

## 2020-06-08 PROBLEM — N13.2 HYDRONEPHROSIS WITH URINARY OBSTRUCTION DUE TO RENAL CALCULUS: Status: ACTIVE | Noted: 2020-06-08

## 2020-06-08 PROBLEM — Z87.891 FORMER SMOKER: Chronic | Status: ACTIVE | Noted: 2020-06-08

## 2020-06-08 LAB
ALBUMIN SERPL-MCNC: 3.8 G/DL (ref 3.5–5.2)
ALBUMIN/GLOB SERPL: 1.2 G/DL
ALP SERPL-CCNC: 141 U/L (ref 39–117)
ALT SERPL W P-5'-P-CCNC: 81 U/L (ref 1–33)
ANION GAP SERPL CALCULATED.3IONS-SCNC: 9.6 MMOL/L (ref 5–15)
AST SERPL-CCNC: 133 U/L (ref 1–32)
BASOPHILS # BLD AUTO: 0.04 10*3/MM3 (ref 0–0.2)
BASOPHILS NFR BLD AUTO: 0.5 % (ref 0–1.5)
BILIRUB SERPL-MCNC: 1.4 MG/DL (ref 0.2–1.2)
BILIRUB UR QL STRIP: NEGATIVE
BUN BLD-MCNC: 28 MG/DL (ref 8–23)
BUN/CREAT SERPL: 11.9 (ref 7–25)
CALCIUM SPEC-SCNC: 9.3 MG/DL (ref 8.6–10.5)
CHLORIDE SERPL-SCNC: 105 MMOL/L (ref 98–107)
CHOLEST SERPL-MCNC: 175 MG/DL (ref 0–200)
CLARITY UR: CLEAR
CO2 SERPL-SCNC: 22.4 MMOL/L (ref 22–29)
COLOR UR: YELLOW
CREAT BLD-MCNC: 2.36 MG/DL (ref 0.57–1)
CRP SERPL-MCNC: 1.42 MG/DL (ref 0–0.5)
D-LACTATE SERPL-SCNC: 1.4 MMOL/L (ref 0.5–2)
DEPRECATED RDW RBC AUTO: 52.8 FL (ref 37–54)
EOSINOPHIL # BLD AUTO: 0.09 10*3/MM3 (ref 0–0.4)
EOSINOPHIL NFR BLD AUTO: 1.1 % (ref 0.3–6.2)
ERYTHROCYTE [DISTWIDTH] IN BLOOD BY AUTOMATED COUNT: 15.2 % (ref 12.3–15.4)
GFR SERPL CREATININE-BSD FRML MDRD: 20 ML/MIN/1.73
GLOBULIN UR ELPH-MCNC: 3.2 GM/DL
GLUCOSE BLD-MCNC: 165 MG/DL (ref 65–99)
GLUCOSE BLDC GLUCOMTR-MCNC: 126 MG/DL (ref 70–130)
GLUCOSE BLDC GLUCOMTR-MCNC: 138 MG/DL (ref 70–130)
GLUCOSE BLDC GLUCOMTR-MCNC: 151 MG/DL (ref 70–130)
GLUCOSE UR STRIP-MCNC: ABNORMAL MG/DL
HAV IGM SERPL QL IA: NORMAL
HBA1C MFR BLD: 7 % (ref 4.8–5.6)
HBV CORE IGM SERPL QL IA: NORMAL
HBV SURFACE AG SERPL QL IA: NORMAL
HCT VFR BLD AUTO: 42.6 % (ref 34–46.6)
HCV AB SER DONR QL: NORMAL
HDLC SERPL-MCNC: 47 MG/DL (ref 40–60)
HGB BLD-MCNC: 13.1 G/DL (ref 12–15.9)
HGB UR QL STRIP.AUTO: NEGATIVE
HOLD SPECIMEN: NORMAL
IMM GRANULOCYTES # BLD AUTO: 0.05 10*3/MM3 (ref 0–0.05)
IMM GRANULOCYTES NFR BLD AUTO: 0.6 % (ref 0–0.5)
KETONES UR QL STRIP: NEGATIVE
LDLC SERPL CALC-MCNC: 92 MG/DL (ref 0–100)
LDLC/HDLC SERPL: 1.97 {RATIO}
LEUKOCYTE ESTERASE UR QL STRIP.AUTO: NEGATIVE
LIPASE SERPL-CCNC: 156 U/L (ref 13–60)
LYMPHOCYTES # BLD AUTO: 2.42 10*3/MM3 (ref 0.7–3.1)
LYMPHOCYTES NFR BLD AUTO: 29.2 % (ref 19.6–45.3)
MAGNESIUM SERPL-MCNC: 1.8 MG/DL (ref 1.6–2.4)
MCH RBC QN AUTO: 28.9 PG (ref 26.6–33)
MCHC RBC AUTO-ENTMCNC: 30.8 G/DL (ref 31.5–35.7)
MCV RBC AUTO: 94 FL (ref 79–97)
MONOCYTES # BLD AUTO: 1.03 10*3/MM3 (ref 0.1–0.9)
MONOCYTES NFR BLD AUTO: 12.4 % (ref 5–12)
NEUTROPHILS # BLD AUTO: 4.66 10*3/MM3 (ref 1.7–7)
NEUTROPHILS NFR BLD AUTO: 56.2 % (ref 42.7–76)
NITRITE UR QL STRIP: NEGATIVE
NRBC BLD AUTO-RTO: 0 /100 WBC (ref 0–0.2)
PH UR STRIP.AUTO: <=5 [PH] (ref 5–8)
PHOSPHATE SERPL-MCNC: 3.6 MG/DL (ref 2.5–4.5)
PLATELET # BLD AUTO: 167 10*3/MM3 (ref 140–450)
PMV BLD AUTO: 10.1 FL (ref 6–12)
POTASSIUM BLD-SCNC: 4.3 MMOL/L (ref 3.5–5.2)
PROCALCITONIN SERPL-MCNC: 0.18 NG/ML (ref 0.1–0.25)
PROT SERPL-MCNC: 7 G/DL (ref 6–8.5)
PROT UR QL STRIP: NEGATIVE
RBC # BLD AUTO: 4.53 10*6/MM3 (ref 3.77–5.28)
SARS-COV-2 RNA PNL SPEC NAA+PROBE: NOT DETECTED
SODIUM BLD-SCNC: 137 MMOL/L (ref 136–145)
SP GR UR STRIP: 1.02 (ref 1–1.03)
TRIGL SERPL-MCNC: 178 MG/DL (ref 0–150)
TSH SERPL DL<=0.05 MIU/L-ACNC: 2.34 UIU/ML (ref 0.27–4.2)
UROBILINOGEN UR QL STRIP: ABNORMAL
VLDLC SERPL-MCNC: 35.6 MG/DL
WBC NRBC COR # BLD: 8.29 10*3/MM3 (ref 3.4–10.8)

## 2020-06-08 PROCEDURE — 25010000002 MAGNESIUM SULFATE IN D5W 1G/100ML (PREMIX) 1-5 GM/100ML-% SOLUTION: Performed by: PHYSICIAN ASSISTANT

## 2020-06-08 PROCEDURE — 87635 SARS-COV-2 COVID-19 AMP PRB: CPT | Performed by: UROLOGY

## 2020-06-08 PROCEDURE — 80074 ACUTE HEPATITIS PANEL: CPT | Performed by: PHYSICIAN ASSISTANT

## 2020-06-08 PROCEDURE — 80061 LIPID PANEL: CPT | Performed by: PHYSICIAN ASSISTANT

## 2020-06-08 PROCEDURE — 99223 1ST HOSP IP/OBS HIGH 75: CPT | Performed by: PHYSICIAN ASSISTANT

## 2020-06-08 PROCEDURE — 63710000001 INSULIN ASPART PER 5 UNITS: Performed by: UROLOGY

## 2020-06-08 PROCEDURE — 0T778DZ DILATION OF LEFT URETER WITH INTRALUMINAL DEVICE, VIA NATURAL OR ARTIFICIAL OPENING ENDOSCOPIC: ICD-10-PCS | Performed by: UROLOGY

## 2020-06-08 PROCEDURE — 82962 GLUCOSE BLOOD TEST: CPT

## 2020-06-08 PROCEDURE — 76000 FLUOROSCOPY <1 HR PHYS/QHP: CPT | Performed by: RADIOLOGY

## 2020-06-08 PROCEDURE — 25010000002 ONDANSETRON PER 1 MG: Performed by: NURSE ANESTHETIST, CERTIFIED REGISTERED

## 2020-06-08 PROCEDURE — 0TC68ZZ EXTIRPATION OF MATTER FROM RIGHT URETER, VIA NATURAL OR ARTIFICIAL OPENING ENDOSCOPIC: ICD-10-PCS | Performed by: UROLOGY

## 2020-06-08 PROCEDURE — 83690 ASSAY OF LIPASE: CPT | Performed by: PHYSICIAN ASSISTANT

## 2020-06-08 PROCEDURE — 83605 ASSAY OF LACTIC ACID: CPT | Performed by: PHYSICIAN ASSISTANT

## 2020-06-08 PROCEDURE — 76000 FLUOROSCOPY <1 HR PHYS/QHP: CPT

## 2020-06-08 PROCEDURE — 84100 ASSAY OF PHOSPHORUS: CPT | Performed by: PHYSICIAN ASSISTANT

## 2020-06-08 PROCEDURE — 84145 PROCALCITONIN (PCT): CPT | Performed by: PHYSICIAN ASSISTANT

## 2020-06-08 PROCEDURE — 25010000002 PROPOFOL 10 MG/ML EMULSION: Performed by: NURSE ANESTHETIST, CERTIFIED REGISTERED

## 2020-06-08 PROCEDURE — 99222 1ST HOSP IP/OBS MODERATE 55: CPT | Performed by: UROLOGY

## 2020-06-08 PROCEDURE — 74018 RADEX ABDOMEN 1 VIEW: CPT

## 2020-06-08 PROCEDURE — 85025 COMPLETE CBC W/AUTO DIFF WBC: CPT | Performed by: PHYSICIAN ASSISTANT

## 2020-06-08 PROCEDURE — C2617 STENT, NON-COR, TEM W/O DEL: HCPCS | Performed by: UROLOGY

## 2020-06-08 PROCEDURE — C1769 GUIDE WIRE: HCPCS | Performed by: UROLOGY

## 2020-06-08 PROCEDURE — 25010000002 FENTANYL CITRATE (PF) 100 MCG/2ML SOLUTION: Performed by: NURSE ANESTHETIST, CERTIFIED REGISTERED

## 2020-06-08 PROCEDURE — 94799 UNLISTED PULMONARY SVC/PX: CPT

## 2020-06-08 PROCEDURE — 52356 CYSTO/URETERO W/LITHOTRIPSY: CPT | Performed by: UROLOGY

## 2020-06-08 PROCEDURE — 86140 C-REACTIVE PROTEIN: CPT | Performed by: PHYSICIAN ASSISTANT

## 2020-06-08 PROCEDURE — 74018 RADEX ABDOMEN 1 VIEW: CPT | Performed by: RADIOLOGY

## 2020-06-08 PROCEDURE — 83735 ASSAY OF MAGNESIUM: CPT | Performed by: PHYSICIAN ASSISTANT

## 2020-06-08 PROCEDURE — 82365 CALCULUS SPECTROSCOPY: CPT | Performed by: UROLOGY

## 2020-06-08 PROCEDURE — 25810000003 SODIUM CHLORIDE 0.9 % WITH KCL 20 MEQ 20-0.9 MEQ/L-% SOLUTION: Performed by: UROLOGY

## 2020-06-08 PROCEDURE — 93005 ELECTROCARDIOGRAM TRACING: CPT | Performed by: PHYSICIAN ASSISTANT

## 2020-06-08 PROCEDURE — 93010 ELECTROCARDIOGRAM REPORT: CPT | Performed by: INTERNAL MEDICINE

## 2020-06-08 PROCEDURE — 81003 URINALYSIS AUTO W/O SCOPE: CPT | Performed by: PHYSICIAN ASSISTANT

## 2020-06-08 PROCEDURE — 25010000002 HEPARIN (PORCINE) PER 1000 UNITS: Performed by: UROLOGY

## 2020-06-08 PROCEDURE — 52353 CYSTOURETERO W/LITHOTRIPSY: CPT | Performed by: UROLOGY

## 2020-06-08 PROCEDURE — 83036 HEMOGLOBIN GLYCOSYLATED A1C: CPT | Performed by: PHYSICIAN ASSISTANT

## 2020-06-08 PROCEDURE — 80053 COMPREHEN METABOLIC PANEL: CPT | Performed by: PHYSICIAN ASSISTANT

## 2020-06-08 PROCEDURE — 84443 ASSAY THYROID STIM HORMONE: CPT | Performed by: PHYSICIAN ASSISTANT

## 2020-06-08 DEVICE — URETERAL STENT
Type: IMPLANTABLE DEVICE | Site: URETER | Status: NON-FUNCTIONAL
Brand: POLARIS™ ULTRA
Removed: 2020-06-19

## 2020-06-08 RX ORDER — OXYCODONE AND ACETAMINOPHEN 7.5; 325 MG/1; MG/1
1 TABLET ORAL EVERY 4 HOURS PRN
Status: DISCONTINUED | OUTPATIENT
Start: 2020-06-08 | End: 2020-06-09 | Stop reason: HOSPADM

## 2020-06-08 RX ORDER — NICOTINE POLACRILEX 4 MG
15 LOZENGE BUCCAL
Status: DISCONTINUED | OUTPATIENT
Start: 2020-06-08 | End: 2020-06-09 | Stop reason: HOSPADM

## 2020-06-08 RX ORDER — ONDANSETRON 2 MG/ML
4 INJECTION INTRAMUSCULAR; INTRAVENOUS AS NEEDED
Status: DISCONTINUED | OUTPATIENT
Start: 2020-06-08 | End: 2020-06-08 | Stop reason: HOSPADM

## 2020-06-08 RX ORDER — NITROGLYCERIN 0.4 MG/1
0.4 TABLET SUBLINGUAL
Status: DISCONTINUED | OUTPATIENT
Start: 2020-06-08 | End: 2020-06-09 | Stop reason: HOSPADM

## 2020-06-08 RX ORDER — ACETAMINOPHEN 325 MG/1
650 TABLET ORAL EVERY 4 HOURS PRN
Status: DISCONTINUED | OUTPATIENT
Start: 2020-06-08 | End: 2020-06-09 | Stop reason: HOSPADM

## 2020-06-08 RX ORDER — SODIUM CHLORIDE 0.9 % (FLUSH) 0.9 %
10 SYRINGE (ML) INJECTION AS NEEDED
Status: DISCONTINUED | OUTPATIENT
Start: 2020-06-08 | End: 2020-06-08 | Stop reason: HOSPADM

## 2020-06-08 RX ORDER — PANTOPRAZOLE SODIUM 40 MG/1
40 TABLET, DELAYED RELEASE ORAL
Status: DISCONTINUED | OUTPATIENT
Start: 2020-06-08 | End: 2020-06-09 | Stop reason: HOSPADM

## 2020-06-08 RX ORDER — ONDANSETRON 2 MG/ML
INJECTION INTRAMUSCULAR; INTRAVENOUS AS NEEDED
Status: DISCONTINUED | OUTPATIENT
Start: 2020-06-08 | End: 2020-06-08 | Stop reason: SURG

## 2020-06-08 RX ORDER — SODIUM CHLORIDE 0.9 % (FLUSH) 0.9 %
10 SYRINGE (ML) INJECTION AS NEEDED
Status: DISCONTINUED | OUTPATIENT
Start: 2020-06-08 | End: 2020-06-09 | Stop reason: HOSPADM

## 2020-06-08 RX ORDER — MAGNESIUM SULFATE 1 G/100ML
1 INJECTION INTRAVENOUS ONCE
Status: COMPLETED | OUTPATIENT
Start: 2020-06-08 | End: 2020-06-08

## 2020-06-08 RX ORDER — MAGNESIUM SULFATE HEPTAHYDRATE 40 MG/ML
2 INJECTION, SOLUTION INTRAVENOUS AS NEEDED
Status: DISCONTINUED | OUTPATIENT
Start: 2020-06-08 | End: 2020-06-09 | Stop reason: HOSPADM

## 2020-06-08 RX ORDER — SODIUM CHLORIDE 0.9 % (FLUSH) 0.9 %
10 SYRINGE (ML) INJECTION EVERY 12 HOURS SCHEDULED
Status: DISCONTINUED | OUTPATIENT
Start: 2020-06-08 | End: 2020-06-08 | Stop reason: HOSPADM

## 2020-06-08 RX ORDER — AMITRIPTYLINE HYDROCHLORIDE 10 MG/1
10 TABLET, FILM COATED ORAL NIGHTLY
COMMUNITY

## 2020-06-08 RX ORDER — SODIUM CHLORIDE AND POTASSIUM CHLORIDE 150; 900 MG/100ML; MG/100ML
100 INJECTION, SOLUTION INTRAVENOUS CONTINUOUS
Status: DISCONTINUED | OUTPATIENT
Start: 2020-06-08 | End: 2020-06-09 | Stop reason: HOSPADM

## 2020-06-08 RX ORDER — DEXTROSE MONOHYDRATE 25 G/50ML
25 INJECTION, SOLUTION INTRAVENOUS
Status: DISCONTINUED | OUTPATIENT
Start: 2020-06-08 | End: 2020-06-09 | Stop reason: HOSPADM

## 2020-06-08 RX ORDER — CETIRIZINE HYDROCHLORIDE 10 MG/1
10 TABLET ORAL DAILY
Status: CANCELLED | OUTPATIENT
Start: 2020-06-08

## 2020-06-08 RX ORDER — MAGNESIUM HYDROXIDE 1200 MG/15ML
LIQUID ORAL AS NEEDED
Status: DISCONTINUED | OUTPATIENT
Start: 2020-06-08 | End: 2020-06-08 | Stop reason: HOSPADM

## 2020-06-08 RX ORDER — SODIUM CHLORIDE 0.9 % (FLUSH) 0.9 %
10 SYRINGE (ML) INJECTION EVERY 12 HOURS SCHEDULED
Status: DISCONTINUED | OUTPATIENT
Start: 2020-06-08 | End: 2020-06-09 | Stop reason: HOSPADM

## 2020-06-08 RX ORDER — HEPARIN SODIUM 5000 [USP'U]/ML
5000 INJECTION, SOLUTION INTRAVENOUS; SUBCUTANEOUS EVERY 12 HOURS SCHEDULED
Status: DISCONTINUED | OUTPATIENT
Start: 2020-06-08 | End: 2020-06-09 | Stop reason: HOSPADM

## 2020-06-08 RX ORDER — LISINOPRIL AND HYDROCHLOROTHIAZIDE 25; 20 MG/1; MG/1
1 TABLET ORAL DAILY
COMMUNITY
End: 2020-06-09 | Stop reason: HOSPADM

## 2020-06-08 RX ORDER — OXYCODONE HYDROCHLORIDE AND ACETAMINOPHEN 5; 325 MG/1; MG/1
1 TABLET ORAL ONCE AS NEEDED
Status: DISCONTINUED | OUTPATIENT
Start: 2020-06-08 | End: 2020-06-08 | Stop reason: HOSPADM

## 2020-06-08 RX ORDER — NALOXONE HCL 0.4 MG/ML
0.4 VIAL (ML) INJECTION
Status: DISCONTINUED | OUTPATIENT
Start: 2020-06-08 | End: 2020-06-09 | Stop reason: HOSPADM

## 2020-06-08 RX ORDER — FENTANYL CITRATE 50 UG/ML
INJECTION, SOLUTION INTRAMUSCULAR; INTRAVENOUS AS NEEDED
Status: DISCONTINUED | OUTPATIENT
Start: 2020-06-08 | End: 2020-06-08 | Stop reason: SURG

## 2020-06-08 RX ORDER — SUMATRIPTAN 50 MG/1
50 TABLET, FILM COATED ORAL ONCE AS NEEDED
Status: CANCELLED | OUTPATIENT
Start: 2020-06-08

## 2020-06-08 RX ORDER — ATROPA BELLADONNA AND OPIUM 16.2; 3 MG/1; MG/1
SUPPOSITORY RECTAL AS NEEDED
Status: DISCONTINUED | OUTPATIENT
Start: 2020-06-08 | End: 2020-06-09 | Stop reason: HOSPADM

## 2020-06-08 RX ORDER — MAGNESIUM SULFATE 1 G/100ML
1 INJECTION INTRAVENOUS AS NEEDED
Status: DISCONTINUED | OUTPATIENT
Start: 2020-06-08 | End: 2020-06-09 | Stop reason: HOSPADM

## 2020-06-08 RX ORDER — SODIUM CHLORIDE, SODIUM LACTATE, POTASSIUM CHLORIDE, CALCIUM CHLORIDE 600; 310; 30; 20 MG/100ML; MG/100ML; MG/100ML; MG/100ML
125 INJECTION, SOLUTION INTRAVENOUS CONTINUOUS
Status: DISCONTINUED | OUTPATIENT
Start: 2020-06-08 | End: 2020-06-09 | Stop reason: HOSPADM

## 2020-06-08 RX ORDER — SODIUM CHLORIDE 9 MG/ML
75 INJECTION, SOLUTION INTRAVENOUS CONTINUOUS
Status: DISCONTINUED | OUTPATIENT
Start: 2020-06-08 | End: 2020-06-09 | Stop reason: HOSPADM

## 2020-06-08 RX ORDER — FLUTICASONE PROPIONATE 50 MCG
2 SPRAY, SUSPENSION (ML) NASAL DAILY PRN
Status: DISCONTINUED | OUTPATIENT
Start: 2020-06-08 | End: 2020-06-09 | Stop reason: HOSPADM

## 2020-06-08 RX ORDER — MONTELUKAST SODIUM 10 MG/1
10 TABLET ORAL DAILY
Status: DISCONTINUED | OUTPATIENT
Start: 2020-06-08 | End: 2020-06-09 | Stop reason: HOSPADM

## 2020-06-08 RX ORDER — HYDROMORPHONE HYDROCHLORIDE 1 MG/ML
0.5 INJECTION, SOLUTION INTRAMUSCULAR; INTRAVENOUS; SUBCUTANEOUS
Status: DISCONTINUED | OUTPATIENT
Start: 2020-06-08 | End: 2020-06-09 | Stop reason: HOSPADM

## 2020-06-08 RX ORDER — IPRATROPIUM BROMIDE AND ALBUTEROL SULFATE 2.5; .5 MG/3ML; MG/3ML
3 SOLUTION RESPIRATORY (INHALATION) ONCE AS NEEDED
Status: DISCONTINUED | OUTPATIENT
Start: 2020-06-08 | End: 2020-06-08 | Stop reason: HOSPADM

## 2020-06-08 RX ORDER — CETIRIZINE HYDROCHLORIDE 10 MG/1
5 TABLET ORAL DAILY
Status: DISCONTINUED | OUTPATIENT
Start: 2020-06-08 | End: 2020-06-09 | Stop reason: HOSPADM

## 2020-06-08 RX ORDER — AMITRIPTYLINE HYDROCHLORIDE 10 MG/1
10 TABLET, FILM COATED ORAL NIGHTLY
Status: DISCONTINUED | OUTPATIENT
Start: 2020-06-08 | End: 2020-06-09 | Stop reason: HOSPADM

## 2020-06-08 RX ORDER — POLYETHYLENE GLYCOL 3350 17 G/17G
17 POWDER, FOR SOLUTION ORAL DAILY PRN
Status: DISCONTINUED | OUTPATIENT
Start: 2020-06-08 | End: 2020-06-09 | Stop reason: HOSPADM

## 2020-06-08 RX ORDER — PROPOFOL 10 MG/ML
VIAL (ML) INTRAVENOUS AS NEEDED
Status: DISCONTINUED | OUTPATIENT
Start: 2020-06-08 | End: 2020-06-08 | Stop reason: SURG

## 2020-06-08 RX ADMIN — INSULIN ASPART 2 UNITS: 100 INJECTION, SOLUTION INTRAVENOUS; SUBCUTANEOUS at 17:48

## 2020-06-08 RX ADMIN — HEPARIN SODIUM 5000 UNITS: 5000 INJECTION INTRAVENOUS; SUBCUTANEOUS at 20:42

## 2020-06-08 RX ADMIN — ACETAMINOPHEN 650 MG: 325 TABLET ORAL at 13:19

## 2020-06-08 RX ADMIN — SODIUM CHLORIDE, PRESERVATIVE FREE 10 ML: 5 INJECTION INTRAVENOUS at 02:17

## 2020-06-08 RX ADMIN — CETIRIZINE HYDROCHLORIDE 5 MG: 10 TABLET, FILM COATED ORAL at 08:22

## 2020-06-08 RX ADMIN — PROPOFOL 150 MG: 10 INJECTION, EMULSION INTRAVENOUS at 11:47

## 2020-06-08 RX ADMIN — ONDANSETRON 4 MG: 2 INJECTION INTRAMUSCULAR; INTRAVENOUS at 11:51

## 2020-06-08 RX ADMIN — SODIUM CHLORIDE, POTASSIUM CHLORIDE, SODIUM LACTATE AND CALCIUM CHLORIDE: 600; 310; 30; 20 INJECTION, SOLUTION INTRAVENOUS at 11:43

## 2020-06-08 RX ADMIN — AMITRIPTYLINE HYDROCHLORIDE 10 MG: 10 TABLET, FILM COATED ORAL at 20:44

## 2020-06-08 RX ADMIN — MONTELUKAST SODIUM 10 MG: 10 TABLET, FILM COATED ORAL at 08:21

## 2020-06-08 RX ADMIN — MAGNESIUM SULFATE HEPTAHYDRATE 1 G: 1 INJECTION, SOLUTION INTRAVENOUS at 03:39

## 2020-06-08 RX ADMIN — SODIUM CHLORIDE, PRESERVATIVE FREE 10 ML: 5 INJECTION INTRAVENOUS at 08:22

## 2020-06-08 RX ADMIN — SODIUM CHLORIDE, PRESERVATIVE FREE 10 ML: 5 INJECTION INTRAVENOUS at 14:32

## 2020-06-08 RX ADMIN — SODIUM CHLORIDE AND POTASSIUM CHLORIDE 100 ML/HR: .9; .15 SOLUTION INTRAVENOUS at 14:32

## 2020-06-08 RX ADMIN — SODIUM CHLORIDE 75 ML/HR: 9 INJECTION, SOLUTION INTRAVENOUS at 02:17

## 2020-06-08 RX ADMIN — FENTANYL CITRATE 100 MCG: 50 INJECTION INTRAMUSCULAR; INTRAVENOUS at 12:11

## 2020-06-08 NOTE — H&P (VIEW-ONLY)
Name:  Laxmi Higginbotham  :  1950    DATE OF ADMISSION  2020    DATE OF CONSULT  2020     REFERRING PHYSICIAN  Helio Miguel    PRIMARY CARE PHYSICIAN  Amanda Pozo MD    REASON FOR CONSULT  Acute renal failure secondary to apparent bilateral obstructing stones.    CHIEF COMPLAINT  Acute renal failure secondary to bilateral obstructing stones    HISTORY OF PRESENT ILLNESS:   Laxmi Higginbotham is a 69 y.o. female with a past medical history significant for non insulin dependent type II diabetes mellitus, hyperlipidemia, GERD, breast cancer s/p right sided lumpectomy in the past, and history of nephrolithiasis s/p lithotripsy in the past that was transferred from OSH ([facility] ED) for evaluation of abdominal pain.  Work-up in the ED at [facility] revealed bilateral nephrolithiasis, ureterolithiasis, and right sided moderate hydronephrosis as well as acute kidney injury (please see transfer documents for details, labs/imaging results summarized below). Patient was transferred for urological evaluation and possible surgical intervention, [facility] MD did discuss with Dr. Rendon with urology who recommended transfer.       Patient states onset of symptoms was approximately 1 AM  morning. Patient states she was woke from sleep secondary to acute onset right lower quadrant abdominal pain. Patient states she was doing well prior to onset with no issues. She reports severe pain, radiating into her right flank and epigastric area. She reports associated nausea and emesis. She denies similar symptoms in the past, symptoms not similar to renal stones in the past. She denies any fevers but does report intermittent chills. Reports decreased appetite due to nausea secondary to pain. She denies any dysuria, hematuria, frequency, or urgency. She denies any chest pain or dyspnea.   I treated her last year for a painful upper ureteral stone.  She has known bilateral residual calyceal stones.  She has been  doing really well until 1 AM Sunday morning.  She has no evidence of sepsis.  I have no basis of her renal function baseline.  I am going to put her on for emergent stent placement  I saw Laxmi Higginbotham in their hospital room this morning.    PAST MEDICAL HISTORY  Past Medical History:   Diagnosis Date   • Breast cancer (CMS/HCC)     right breast   • Diabetes mellitus (CMS/HCC)    • Elevated cholesterol    • GERD (gastroesophageal reflux disease)    • H/O seasonal allergies    • High cholesterol    • Kidney stone    • Migraines    • Osteoarthritis    • Osteoporosis        PAST SURGICAL HISTORY  Past Surgical History:   Procedure Laterality Date   • BREAST LUMPECTOMY      RIGHT   • COLONOSCOPY     • EXTRACORPOREAL SHOCK WAVE LITHOTRIPSY (ESWL) Right 5/17/2019    Procedure: EXTRACORPOREAL SHOCKWAVE LITHOTRIPSY;  Surgeon: Darrick Alexis MD;  Location: [facility];  Service: Urology   • GALLBLADDER SURGERY     • TUBAL ABDOMINAL LIGATION         SOCIAL HISTORY  Social History     Socioeconomic History   • Marital status:      Spouse name: Not on file   • Number of children: Not on file   • Years of education: Not on file   • Highest education level: Not on file   Tobacco Use   • Smoking status: Former Smoker     Packs/day: 1.00     Years: 15.00     Pack years: 15.00     Types: Cigarettes   • Smokeless tobacco: Never Used   Substance and Sexual Activity   • Alcohol use: No   • Drug use: No   • Sexual activity: Defer       FAMILY HISTORY  Family History   Problem Relation Age of Onset   • Heart disease Father    • Cancer Mother        ALLERGIES  No Known Allergies    INPATIENT MEDICATIONS  Current Facility-Administered Medications   Medication Dose Route Frequency Provider Last Rate Last Dose   • amitriptyline (ELAVIL) tablet 10 mg  10 mg Oral Nightly Beckie Jacob PA       • cetirizine (zyrTEC) tablet 5 mg  5 mg Oral Daily Beckie Jacob PA       • dextrose (D50W) 25 g/ 50mL Intravenous Solution 25 g   25 g Intravenous Q15 Min PRN O'Beckie Pollard PA       • dextrose (GLUTOSE) oral gel 15 g  15 g Oral Q15 Min PRN O'Beckie Pollard PA       • fluticasone (FLONASE) 50 MCG/ACT nasal spray 2 spray  2 spray Nasal Daily PRN O'Beckie Pollard PA       • glucagon (human recombinant) (GLUCAGEN DIAGNOSTIC) injection 1 mg  1 mg Subcutaneous Q15 Min PRN O'Beckie Pollard PA       • heparin (porcine) 5000 UNIT/ML injection 5,000 Units  5,000 Units Subcutaneous Q12H O'Beckie Pollard PA       • insulin aspart (novoLOG) injection 0-7 Units  0-7 Units Subcutaneous TID AC ROOPA'Beckie Pollard PA       • Magnesium Sulfate 2 gram infusion - Mg less than or equal to 1.5 mg/dL  2 g Intravenous PRN ROOPA'Beckie Pollard PA        Or   • Magnesium Sulfate 1 gram infusion - Mg 1.6-1.9 mg/dL  1 g Intravenous PRN O'Beckie Pollard PA       • montelukast (SINGULAIR) tablet 10 mg  10 mg Oral Daily O'Beckie Pollard PA       • nitroglycerin (NITROSTAT) SL tablet 0.4 mg  0.4 mg Sublingual Q5 Min PRN O'Beckie Pollard PA       • pantoprazole (PROTONIX) EC tablet 40 mg  40 mg Oral Q AM O'Beckie Pollard PA       • polyethylene glycol (MIRALAX) powder 17 g  17 g Oral Daily PRN O'Beckie Pollard PA       • sodium chloride 0.9 % flush 10 mL  10 mL Intravenous Q12H O'Beckie Pollard PA   10 mL at 06/08/20 0217   • sodium chloride 0.9 % flush 10 mL  10 mL Intravenous PRN O'Beckie Pollard PA       • sodium chloride 0.9 % infusion  75 mL/hr Intravenous Continuous O'Beckie Pollard PA 75 mL/hr at 06/08/20 0217 75 mL/hr at 06/08/20 0217       REVIEW OF SYSTEMS  CONSTITUTIONAL:  No fever, chills, night sweats or fatigue.  EYES:  No blurry vision, diplopia or other vision changes.  ENT:  No hearing loss, nosebleeds or sore throat.  CARDIOVASCULAR:  No palpitations, arrhythmia, syncopal episodes or edema.  PULMONARY:  No hemoptysis, wheezing, chronic cough or shortness of breath.  GASTROINTESTINAL:  No nausea or vomiting.  No constipation or  "diarrhea.  No abdominal pain.  GENITOURINARY:  No hematuria, kidney stones or frequent urination.  MUSCULOSKELETAL:  No joint or back pains.  INTEGUMENTARY: No rashes or pruritus.  ENDOCRINE:  No excessive thirst or hot flashes.  HEMATOLOGIC:  No history of free bleeding, spontaneous bleeding or clotting.  IMMUNOLOGIC:  No allergies or frequent infections.  NEUROLOGIC: No numbness, tingling, seizures or weakness.  PSYCHIATRIC:  No anxiety or depression.    PHYSICAL EXAMINATION    /67 (BP Location: Left arm, Patient Position: Lying)   Pulse 67   Temp 97.9 °F (36.6 °C) (Oral)   Resp 18   Ht 162.6 cm (64\")   Wt 69.4 kg (153 lb 1.6 oz)   SpO2 92%   BMI 26.28 kg/m²     GENERAL:  A well-developed, well-nourished, white female in no acute distress.  HEENT:  Pupils equally round and reactive to light.  Extraocular muscles intact.  CARDIOVASCULAR:  Regular rate and rhythm.  No murmurs, gallops or rubs.  LUNGS:  Clear to auscultation bilaterally.  ABDOMEN:  Soft, nontender, nondistended with positive bowel sounds.  EXTREMITIES:  No clubbing, cyanosis or edema bilaterally.  SKIN:  No rashes or petechiae.  NEURO:  Cranial nerves grossly intact.  No focal deficits.  PSYCH:  Alert and oriented x3.    LABORATORY     WBC   Date Value Ref Range Status   06/08/2020 8.29 3.40 - 10.80 10*3/mm3 Final     RBC   Date Value Ref Range Status   06/08/2020 4.53 3.77 - 5.28 10*6/mm3 Final     Hemoglobin   Date Value Ref Range Status   06/08/2020 13.1 12.0 - 15.9 g/dL Final     Hematocrit   Date Value Ref Range Status   06/08/2020 42.6 34.0 - 46.6 % Final     MCV   Date Value Ref Range Status   06/08/2020 94.0 79.0 - 97.0 fL Final     MCH   Date Value Ref Range Status   06/08/2020 28.9 26.6 - 33.0 pg Final     MCHC   Date Value Ref Range Status   06/08/2020 30.8 (L) 31.5 - 35.7 g/dL Final     RDW   Date Value Ref Range Status   06/08/2020 15.2 12.3 - 15.4 % Final     RDW-SD   Date Value Ref Range Status   06/08/2020 52.8 37.0 - " 54.0 fl Final     MPV   Date Value Ref Range Status   06/08/2020 10.1 6.0 - 12.0 fL Final     Platelets   Date Value Ref Range Status   06/08/2020 167 140 - 450 10*3/mm3 Final     Neutrophil %   Date Value Ref Range Status   06/08/2020 56.2 42.7 - 76.0 % Final     Lymphocyte %   Date Value Ref Range Status   06/08/2020 29.2 19.6 - 45.3 % Final     Monocyte %   Date Value Ref Range Status   06/08/2020 12.4 (H) 5.0 - 12.0 % Final     Eosinophil %   Date Value Ref Range Status   06/08/2020 1.1 0.3 - 6.2 % Final     Basophil %   Date Value Ref Range Status   06/08/2020 0.5 0.0 - 1.5 % Final     Immature Grans %   Date Value Ref Range Status   06/08/2020 0.6 (H) 0.0 - 0.5 % Final     Neutrophils, Absolute   Date Value Ref Range Status   06/08/2020 4.66 1.70 - 7.00 10*3/mm3 Final     Lymphocytes, Absolute   Date Value Ref Range Status   06/08/2020 2.42 0.70 - 3.10 10*3/mm3 Final     Monocytes, Absolute   Date Value Ref Range Status   06/08/2020 1.03 (H) 0.10 - 0.90 10*3/mm3 Final     Eosinophils, Absolute   Date Value Ref Range Status   06/08/2020 0.09 0.00 - 0.40 10*3/mm3 Final     Basophils, Absolute   Date Value Ref Range Status   06/08/2020 0.04 0.00 - 0.20 10*3/mm3 Final     Immature Grans, Absolute   Date Value Ref Range Status   06/08/2020 0.05 0.00 - 0.05 10*3/mm3 Final     nRBC   Date Value Ref Range Status   06/08/2020 0.0 0.0 - 0.2 /100 WBC Final       Glucose   Date Value Ref Range Status   06/08/2020 165 (H) 65 - 99 mg/dL Final     Sodium   Date Value Ref Range Status   06/08/2020 137 136 - 145 mmol/L Final     Potassium   Date Value Ref Range Status   06/08/2020 4.3 3.5 - 5.2 mmol/L Final     CO2   Date Value Ref Range Status   06/08/2020 22.4 22.0 - 29.0 mmol/L Final     Chloride   Date Value Ref Range Status   06/08/2020 105 98 - 107 mmol/L Final     Anion Gap   Date Value Ref Range Status   06/08/2020 9.6 5.0 - 15.0 mmol/L Final     Creatinine   Date Value Ref Range Status   06/08/2020 2.36 (H) 0.57 -  1.00 mg/dL Final     BUN   Date Value Ref Range Status   06/08/2020 28 (H) 8 - 23 mg/dL Final     BUN/Creatinine Ratio   Date Value Ref Range Status   06/08/2020 11.9 7.0 - 25.0 Final     Calcium   Date Value Ref Range Status   06/08/2020 9.3 8.6 - 10.5 mg/dL Final     eGFR Non  Amer   Date Value Ref Range Status   06/08/2020 20 (L) >60 mL/min/1.73 Final     Alkaline Phosphatase   Date Value Ref Range Status   06/08/2020 141 (H) 39 - 117 U/L Final     Total Protein   Date Value Ref Range Status   06/08/2020 7.0 6.0 - 8.5 g/dL Final     ALT (SGPT)   Date Value Ref Range Status   06/08/2020 81 (H) 1 - 33 U/L Final     AST (SGOT)   Date Value Ref Range Status   06/08/2020 133 (H) 1 - 32 U/L Final     Total Bilirubin   Date Value Ref Range Status   06/08/2020 1.4 (H) 0.2 - 1.2 mg/dL Final     Albumin   Date Value Ref Range Status   06/08/2020 3.80 3.50 - 5.20 g/dL Final     Globulin   Date Value Ref Range Status   06/08/2020 3.2 gm/dL Final       Magnesium   Date Value Ref Range Status   06/08/2020 1.8 1.6 - 2.4 mg/dL Final     Phosphorus   Date Value Ref Range Status   06/08/2020 3.6 2.5 - 4.5 mg/dL Final     No results found for: LDH, URICACID     IMAGING  Imaging Results (Last 72 Hours)     ** No results found for the last 72 hours. **          PATHOLOGY  * No orders in the log *    IMPRESSION AND PLAN  Laxmi Higginbotham is a 69 y.o., white female with:  #1.-Acute renal failure secondary to apparent bilateral obstructing stones no imaging is been provided by [facility].  I will get a KUB today.  I am to put her in for emergent stent placement.    The patient was in agreement with these plans.    Thank you for asking us to participate in Laxmi Higginbotham's care.  We will continue to follow with you.  Please do not hesitate to call with any questions or concerns that you may have.    A total of 60 minutes were spent coordinating this patient’s care in clinic today; 30 minutes of which were face-to-face with the patient,  reviewing medical history and counseling on the current treatment and followup plan.  All questions were answered to patient's satisfaction.       This document was signed by No name on file. June 8, 2020 07:08

## 2020-06-08 NOTE — PLAN OF CARE
Problem: Patient Care Overview  Goal: Plan of Care Review  Outcome: Outcome(s) achieved  Flowsheets  Taken 6/8/2020 0346  Progress: no change  Taken 6/8/2020 0036  Plan of Care Reviewed With: patient  Goal: Individualization and Mutuality  Outcome: Outcome(s) achieved  Goal: Discharge Needs Assessment  Outcome: Outcome(s) achieved  Flowsheets  Taken 6/8/2020 0031  Equipment Currently Used at Home: cane, straight  Transportation Anticipated: family or friend will provide  Patient/Family Anticipated Services at Transition: none  Patient/Family Anticipates Transition to: home with family  Taken 6/8/2020 0346  Transportation Concerns: car, none  Concerns to be Addressed: no discharge needs identified  Readmission Within the Last 30 Days: no previous admission in last 30 days  Goal: Interprofessional Rounds/Family Conf  Outcome: Outcome(s) achieved  Flowsheets (Taken 6/8/2020 0346)  Participants: patient; nursing; physician     Problem: Fall Risk (Adult)  Goal: Identify Related Risk Factors and Signs and Symptoms  Outcome: Outcome(s) achieved  Goal: Absence of Fall  Outcome: Outcome(s) achieved     Problem: Pain, Chronic (Adult)  Goal: Identify Related Risk Factors and Signs and Symptoms  Outcome: Outcome(s) achieved  Goal: Acceptable Pain/Comfort Level and Functional Ability  Outcome: Outcome(s) achieved     Problem: Renal Failure/Kidney Injury, Acute (Adult)  Goal: Signs and Symptoms of Listed Potential Problems Will be Absent, Minimized or Managed (Renal Failure/Kidney Injury, Acute)  Outcome: Outcome(s) achieved

## 2020-06-08 NOTE — OP NOTE
CYSTOSCOPY RETROGRADE PYELOGRAM AND STENT INSERTION  Procedure Note    Laxmi Bravocarline  6/8/2020    Pre-op Diagnosis:   Nephrolithiasis [N20.0]    Post-op Diagnosis:     Post-Op Diagnosis Codes:     * Nephrolithiasis [N20.0]    Procedure/CPT® Codes:  69-year-old white female well-known to me with a history of prior lithotripsy in 2019 presented to the [facility] with severe pain on Sunday morning at 1 AM found to have a right obstruction.  Secondary to to 4 mm distal stones and a left proximal obstruction following an informed consent brought to the operative suite after unremarkable general anesthesia prepped and draped in a low dorsolithotomy position I did a careful right-sided ureteroscopy identified 2 calcium oxalate dihydrate stones broken up and extracted HP sequentially the remainder the ureter was unremarkable chose not to leave a stent on that side contralaterally was able to get up the ureter but when elevated limits the scope saw the stone but washed up into the kidney because of the history of creatinine of in the twos I elected to place a stent I placed a stent in perfect position visually and fluoroscopically this is a 5 x 24 double-J stent.  A belladonna and opium suppository was inserted for postop spasms was tolerated well    Procedure(s):  CYSTOSCOPY& BILATERAL  URETEROSCOPY WITH LEFT STENT INSERTION & LASER LITHOTRIPSY OF THE RIGHT    Surgeon(s):  Darrick Alexis MD    Anesthesia: see anesthesia record    Staff:   Circulator: Oneal Alexander RN  Scrub Person: Hailey Brooke  Assistant: Christen Bradshaw    Estimated Blood Loss: none  Urine Voided: * No values recorded between 6/8/2020 11:43 AM and 6/8/2020 12:14 PM *    Specimens:                ID Type Source Tests Collected by Time   1 (Not marked as sent) :  Calculus Ureter, Right STONE ANALYSIS Darrick Alexis MD 6/8/2020 1214         Drains: Left stent    Findings: Right distal ureteral stones left proximal  ureteral stone acute renal failure    Blood: N/A    Complications: None    Grafts and Implants: None    Darrick Alexis MD     Date: 6/8/2020  Time: 12:14

## 2020-06-08 NOTE — ANESTHESIA PROCEDURE NOTES
Airway  Urgency: elective    Date/Time: 6/8/2020 11:47 AM  Airway not difficult    General Information and Staff    Patient location during procedure: OR  Anesthesiologist: Kyle Schumacher DO  CRNA: Shanice Murry CRNA    Indications and Patient Condition  Indications for airway management: airway protection    Preoxygenated: yes  Mask difficulty assessment: 0 - not attempted    Final Airway Details  Final airway type: supraglottic airway      Successful airway: classic and unique  Size 4    Number of attempts at approach: 1  Assessment: lips, teeth, and gum same as pre-op

## 2020-06-08 NOTE — H&P (VIEW-ONLY)
Name:  Laxmi Higginbotham  :  1950    DATE OF ADMISSION  2020    DATE OF CONSULT  2020     REFERRING PHYSICIAN  Helio Miguel    PRIMARY CARE PHYSICIAN  Amanda Pozo MD    REASON FOR CONSULT  Acute renal failure secondary to apparent bilateral obstructing stones.    CHIEF COMPLAINT  Acute renal failure secondary to bilateral obstructing stones    HISTORY OF PRESENT ILLNESS:   Laxmi Higginbotham is a 69 y.o. female with a past medical history significant for non insulin dependent type II diabetes mellitus, hyperlipidemia, GERD, breast cancer s/p right sided lumpectomy in the past, and history of nephrolithiasis s/p lithotripsy in the past that was transferred from OSH ([facility] ED) for evaluation of abdominal pain.  Work-up in the ED at [facility] revealed bilateral nephrolithiasis, ureterolithiasis, and right sided moderate hydronephrosis as well as acute kidney injury (please see transfer documents for details, labs/imaging results summarized below). Patient was transferred for urological evaluation and possible surgical intervention, [facility] MD did discuss with Dr. Rendon with urology who recommended transfer.       Patient states onset of symptoms was approximately 1 AM  morning. Patient states she was woke from sleep secondary to acute onset right lower quadrant abdominal pain. Patient states she was doing well prior to onset with no issues. She reports severe pain, radiating into her right flank and epigastric area. She reports associated nausea and emesis. She denies similar symptoms in the past, symptoms not similar to renal stones in the past. She denies any fevers but does report intermittent chills. Reports decreased appetite due to nausea secondary to pain. She denies any dysuria, hematuria, frequency, or urgency. She denies any chest pain or dyspnea.   I treated her last year for a painful upper ureteral stone.  She has known bilateral residual calyceal stones.  She has been  doing really well until 1 AM Sunday morning.  She has no evidence of sepsis.  I have no basis of her renal function baseline.  I am going to put her on for emergent stent placement  I saw Laxmi Higginbotham in their hospital room this morning.    PAST MEDICAL HISTORY  Past Medical History:   Diagnosis Date   • Breast cancer (CMS/HCC)     right breast   • Diabetes mellitus (CMS/HCC)    • Elevated cholesterol    • GERD (gastroesophageal reflux disease)    • H/O seasonal allergies    • High cholesterol    • Kidney stone    • Migraines    • Osteoarthritis    • Osteoporosis        PAST SURGICAL HISTORY  Past Surgical History:   Procedure Laterality Date   • BREAST LUMPECTOMY      RIGHT   • COLONOSCOPY     • EXTRACORPOREAL SHOCK WAVE LITHOTRIPSY (ESWL) Right 5/17/2019    Procedure: EXTRACORPOREAL SHOCKWAVE LITHOTRIPSY;  Surgeon: Darrick Alexis MD;  Location: [facility];  Service: Urology   • GALLBLADDER SURGERY     • TUBAL ABDOMINAL LIGATION         SOCIAL HISTORY  Social History     Socioeconomic History   • Marital status:      Spouse name: Not on file   • Number of children: Not on file   • Years of education: Not on file   • Highest education level: Not on file   Tobacco Use   • Smoking status: Former Smoker     Packs/day: 1.00     Years: 15.00     Pack years: 15.00     Types: Cigarettes   • Smokeless tobacco: Never Used   Substance and Sexual Activity   • Alcohol use: No   • Drug use: No   • Sexual activity: Defer       FAMILY HISTORY  Family History   Problem Relation Age of Onset   • Heart disease Father    • Cancer Mother        ALLERGIES  No Known Allergies    INPATIENT MEDICATIONS  Current Facility-Administered Medications   Medication Dose Route Frequency Provider Last Rate Last Dose   • amitriptyline (ELAVIL) tablet 10 mg  10 mg Oral Nightly Beckie Jacob PA       • cetirizine (zyrTEC) tablet 5 mg  5 mg Oral Daily Beckie Jacob PA       • dextrose (D50W) 25 g/ 50mL Intravenous Solution 25 g   25 g Intravenous Q15 Min PRN O'Beckie Pollard PA       • dextrose (GLUTOSE) oral gel 15 g  15 g Oral Q15 Min PRN O'Beckie Pollard PA       • fluticasone (FLONASE) 50 MCG/ACT nasal spray 2 spray  2 spray Nasal Daily PRN O'Beckie Pollard PA       • glucagon (human recombinant) (GLUCAGEN DIAGNOSTIC) injection 1 mg  1 mg Subcutaneous Q15 Min PRN O'Beckie Pollard PA       • heparin (porcine) 5000 UNIT/ML injection 5,000 Units  5,000 Units Subcutaneous Q12H O'Beckie Pollard PA       • insulin aspart (novoLOG) injection 0-7 Units  0-7 Units Subcutaneous TID AC ROOPA'Beckie Pollard PA       • Magnesium Sulfate 2 gram infusion - Mg less than or equal to 1.5 mg/dL  2 g Intravenous PRN ROOPA'Beckie Pollard PA        Or   • Magnesium Sulfate 1 gram infusion - Mg 1.6-1.9 mg/dL  1 g Intravenous PRN O'Beckie Pollard PA       • montelukast (SINGULAIR) tablet 10 mg  10 mg Oral Daily O'Beckie Pollard PA       • nitroglycerin (NITROSTAT) SL tablet 0.4 mg  0.4 mg Sublingual Q5 Min PRN O'Beckie Pollard PA       • pantoprazole (PROTONIX) EC tablet 40 mg  40 mg Oral Q AM O'Beckie Pollard PA       • polyethylene glycol (MIRALAX) powder 17 g  17 g Oral Daily PRN O'Beckie Pollard PA       • sodium chloride 0.9 % flush 10 mL  10 mL Intravenous Q12H O'Beckie Pollard PA   10 mL at 06/08/20 0217   • sodium chloride 0.9 % flush 10 mL  10 mL Intravenous PRN O'Beckie Pollard PA       • sodium chloride 0.9 % infusion  75 mL/hr Intravenous Continuous O'Beckie Pollard PA 75 mL/hr at 06/08/20 0217 75 mL/hr at 06/08/20 0217       REVIEW OF SYSTEMS  CONSTITUTIONAL:  No fever, chills, night sweats or fatigue.  EYES:  No blurry vision, diplopia or other vision changes.  ENT:  No hearing loss, nosebleeds or sore throat.  CARDIOVASCULAR:  No palpitations, arrhythmia, syncopal episodes or edema.  PULMONARY:  No hemoptysis, wheezing, chronic cough or shortness of breath.  GASTROINTESTINAL:  No nausea or vomiting.  No constipation or  "diarrhea.  No abdominal pain.  GENITOURINARY:  No hematuria, kidney stones or frequent urination.  MUSCULOSKELETAL:  No joint or back pains.  INTEGUMENTARY: No rashes or pruritus.  ENDOCRINE:  No excessive thirst or hot flashes.  HEMATOLOGIC:  No history of free bleeding, spontaneous bleeding or clotting.  IMMUNOLOGIC:  No allergies or frequent infections.  NEUROLOGIC: No numbness, tingling, seizures or weakness.  PSYCHIATRIC:  No anxiety or depression.    PHYSICAL EXAMINATION    /67 (BP Location: Left arm, Patient Position: Lying)   Pulse 67   Temp 97.9 °F (36.6 °C) (Oral)   Resp 18   Ht 162.6 cm (64\")   Wt 69.4 kg (153 lb 1.6 oz)   SpO2 92%   BMI 26.28 kg/m²     GENERAL:  A well-developed, well-nourished, white female in no acute distress.  HEENT:  Pupils equally round and reactive to light.  Extraocular muscles intact.  CARDIOVASCULAR:  Regular rate and rhythm.  No murmurs, gallops or rubs.  LUNGS:  Clear to auscultation bilaterally.  ABDOMEN:  Soft, nontender, nondistended with positive bowel sounds.  EXTREMITIES:  No clubbing, cyanosis or edema bilaterally.  SKIN:  No rashes or petechiae.  NEURO:  Cranial nerves grossly intact.  No focal deficits.  PSYCH:  Alert and oriented x3.    LABORATORY     WBC   Date Value Ref Range Status   06/08/2020 8.29 3.40 - 10.80 10*3/mm3 Final     RBC   Date Value Ref Range Status   06/08/2020 4.53 3.77 - 5.28 10*6/mm3 Final     Hemoglobin   Date Value Ref Range Status   06/08/2020 13.1 12.0 - 15.9 g/dL Final     Hematocrit   Date Value Ref Range Status   06/08/2020 42.6 34.0 - 46.6 % Final     MCV   Date Value Ref Range Status   06/08/2020 94.0 79.0 - 97.0 fL Final     MCH   Date Value Ref Range Status   06/08/2020 28.9 26.6 - 33.0 pg Final     MCHC   Date Value Ref Range Status   06/08/2020 30.8 (L) 31.5 - 35.7 g/dL Final     RDW   Date Value Ref Range Status   06/08/2020 15.2 12.3 - 15.4 % Final     RDW-SD   Date Value Ref Range Status   06/08/2020 52.8 37.0 - " 54.0 fl Final     MPV   Date Value Ref Range Status   06/08/2020 10.1 6.0 - 12.0 fL Final     Platelets   Date Value Ref Range Status   06/08/2020 167 140 - 450 10*3/mm3 Final     Neutrophil %   Date Value Ref Range Status   06/08/2020 56.2 42.7 - 76.0 % Final     Lymphocyte %   Date Value Ref Range Status   06/08/2020 29.2 19.6 - 45.3 % Final     Monocyte %   Date Value Ref Range Status   06/08/2020 12.4 (H) 5.0 - 12.0 % Final     Eosinophil %   Date Value Ref Range Status   06/08/2020 1.1 0.3 - 6.2 % Final     Basophil %   Date Value Ref Range Status   06/08/2020 0.5 0.0 - 1.5 % Final     Immature Grans %   Date Value Ref Range Status   06/08/2020 0.6 (H) 0.0 - 0.5 % Final     Neutrophils, Absolute   Date Value Ref Range Status   06/08/2020 4.66 1.70 - 7.00 10*3/mm3 Final     Lymphocytes, Absolute   Date Value Ref Range Status   06/08/2020 2.42 0.70 - 3.10 10*3/mm3 Final     Monocytes, Absolute   Date Value Ref Range Status   06/08/2020 1.03 (H) 0.10 - 0.90 10*3/mm3 Final     Eosinophils, Absolute   Date Value Ref Range Status   06/08/2020 0.09 0.00 - 0.40 10*3/mm3 Final     Basophils, Absolute   Date Value Ref Range Status   06/08/2020 0.04 0.00 - 0.20 10*3/mm3 Final     Immature Grans, Absolute   Date Value Ref Range Status   06/08/2020 0.05 0.00 - 0.05 10*3/mm3 Final     nRBC   Date Value Ref Range Status   06/08/2020 0.0 0.0 - 0.2 /100 WBC Final       Glucose   Date Value Ref Range Status   06/08/2020 165 (H) 65 - 99 mg/dL Final     Sodium   Date Value Ref Range Status   06/08/2020 137 136 - 145 mmol/L Final     Potassium   Date Value Ref Range Status   06/08/2020 4.3 3.5 - 5.2 mmol/L Final     CO2   Date Value Ref Range Status   06/08/2020 22.4 22.0 - 29.0 mmol/L Final     Chloride   Date Value Ref Range Status   06/08/2020 105 98 - 107 mmol/L Final     Anion Gap   Date Value Ref Range Status   06/08/2020 9.6 5.0 - 15.0 mmol/L Final     Creatinine   Date Value Ref Range Status   06/08/2020 2.36 (H) 0.57 -  1.00 mg/dL Final     BUN   Date Value Ref Range Status   06/08/2020 28 (H) 8 - 23 mg/dL Final     BUN/Creatinine Ratio   Date Value Ref Range Status   06/08/2020 11.9 7.0 - 25.0 Final     Calcium   Date Value Ref Range Status   06/08/2020 9.3 8.6 - 10.5 mg/dL Final     eGFR Non  Amer   Date Value Ref Range Status   06/08/2020 20 (L) >60 mL/min/1.73 Final     Alkaline Phosphatase   Date Value Ref Range Status   06/08/2020 141 (H) 39 - 117 U/L Final     Total Protein   Date Value Ref Range Status   06/08/2020 7.0 6.0 - 8.5 g/dL Final     ALT (SGPT)   Date Value Ref Range Status   06/08/2020 81 (H) 1 - 33 U/L Final     AST (SGOT)   Date Value Ref Range Status   06/08/2020 133 (H) 1 - 32 U/L Final     Total Bilirubin   Date Value Ref Range Status   06/08/2020 1.4 (H) 0.2 - 1.2 mg/dL Final     Albumin   Date Value Ref Range Status   06/08/2020 3.80 3.50 - 5.20 g/dL Final     Globulin   Date Value Ref Range Status   06/08/2020 3.2 gm/dL Final       Magnesium   Date Value Ref Range Status   06/08/2020 1.8 1.6 - 2.4 mg/dL Final     Phosphorus   Date Value Ref Range Status   06/08/2020 3.6 2.5 - 4.5 mg/dL Final     No results found for: LDH, URICACID     IMAGING  Imaging Results (Last 72 Hours)     ** No results found for the last 72 hours. **          PATHOLOGY  * No orders in the log *    IMPRESSION AND PLAN  Laxmi Higginbotham is a 69 y.o., white female with:  #1.-Acute renal failure secondary to apparent bilateral obstructing stones no imaging is been provided by [facility].  I will get a KUB today.  I am to put her in for emergent stent placement.    The patient was in agreement with these plans.    Thank you for asking us to participate in Laxmi Higginbotham's care.  We will continue to follow with you.  Please do not hesitate to call with any questions or concerns that you may have.    A total of 60 minutes were spent coordinating this patient’s care in clinic today; 30 minutes of which were face-to-face with the patient,  reviewing medical history and counseling on the current treatment and followup plan.  All questions were answered to patient's satisfaction.       This document was signed by No name on file. June 8, 2020 07:08

## 2020-06-08 NOTE — ANESTHESIA PREPROCEDURE EVALUATION
Anesthesia Evaluation     Patient summary reviewed and Nursing notes reviewed   no history of anesthetic complications:  NPO Solid Status: > 8 hours  NPO Liquid Status: > 8 hours           Airway   Mallampati: III  TM distance: >3 FB  Neck ROM: full  No difficulty expected  Dental    (+) edentulous    Pulmonary - negative pulmonary ROS and normal exam   Cardiovascular - normal exam  Exercise tolerance: good (4-7 METS)    NYHA Classification: II  ECG reviewed  Rhythm: regular  Rate: normal    (+) hypertension, hyperlipidemia,       Neuro/Psych  (+) headaches,     GI/Hepatic/Renal/Endo    (+)  GERD,  renal disease stones, diabetes mellitus type 2,     Musculoskeletal     Abdominal  - normal exam    Bowel sounds: normal.   Substance History - negative use     OB/GYN negative ob/gyn ROS         Other   arthritis,    history of cancer                    Anesthesia Plan    ASA 2     general   (Denies CP/syncope/dyspnea  )  intravenous induction     Anesthetic plan, all risks, benefits, and alternatives have been provided, discussed and informed consent has been obtained with: patient.    Plan discussed with CRNA.

## 2020-06-08 NOTE — CONSULTS
Name:  Laxmi Higginbotham  :  1950    DATE OF ADMISSION  2020    DATE OF CONSULT  2020     REFERRING PHYSICIAN  Helio Miguel    PRIMARY CARE PHYSICIAN  Amanda Pozo MD    REASON FOR CONSULT  Acute renal failure secondary to apparent bilateral obstructing stones.    CHIEF COMPLAINT  Acute renal failure secondary to bilateral obstructing stones    HISTORY OF PRESENT ILLNESS:   Laxmi Higginbotham is a 69 y.o. female with a past medical history significant for non insulin dependent type II diabetes mellitus, hyperlipidemia, GERD, breast cancer s/p right sided lumpectomy in the past, and history of nephrolithiasis s/p lithotripsy in the past that was transferred from OSH ([facility] ED) for evaluation of abdominal pain.  Work-up in the ED at [facility] revealed bilateral nephrolithiasis, ureterolithiasis, and right sided moderate hydronephrosis as well as acute kidney injury (please see transfer documents for details, labs/imaging results summarized below). Patient was transferred for urological evaluation and possible surgical intervention, [facility] MD did discuss with Dr. Rendon with urology who recommended transfer.       Patient states onset of symptoms was approximately 1 AM  morning. Patient states she was woke from sleep secondary to acute onset right lower quadrant abdominal pain. Patient states she was doing well prior to onset with no issues. She reports severe pain, radiating into her right flank and epigastric area. She reports associated nausea and emesis. She denies similar symptoms in the past, symptoms not similar to renal stones in the past. She denies any fevers but does report intermittent chills. Reports decreased appetite due to nausea secondary to pain. She denies any dysuria, hematuria, frequency, or urgency. She denies any chest pain or dyspnea.   I treated her last year for a painful upper ureteral stone.  She has known bilateral residual calyceal stones.  She has been  doing really well until 1 AM Sunday morning.  She has no evidence of sepsis.  I have no basis of her renal function baseline.  I am going to put her on for emergent stent placement  I saw Laxmi Higginbotham in their hospital room this morning.    PAST MEDICAL HISTORY  Past Medical History:   Diagnosis Date   • Breast cancer (CMS/HCC)     right breast   • Diabetes mellitus (CMS/HCC)    • Elevated cholesterol    • GERD (gastroesophageal reflux disease)    • H/O seasonal allergies    • High cholesterol    • Kidney stone    • Migraines    • Osteoarthritis    • Osteoporosis        PAST SURGICAL HISTORY  Past Surgical History:   Procedure Laterality Date   • BREAST LUMPECTOMY      RIGHT   • COLONOSCOPY     • EXTRACORPOREAL SHOCK WAVE LITHOTRIPSY (ESWL) Right 5/17/2019    Procedure: EXTRACORPOREAL SHOCKWAVE LITHOTRIPSY;  Surgeon: Darrick Alexis MD;  Location: [facility];  Service: Urology   • GALLBLADDER SURGERY     • TUBAL ABDOMINAL LIGATION         SOCIAL HISTORY  Social History     Socioeconomic History   • Marital status:      Spouse name: Not on file   • Number of children: Not on file   • Years of education: Not on file   • Highest education level: Not on file   Tobacco Use   • Smoking status: Former Smoker     Packs/day: 1.00     Years: 15.00     Pack years: 15.00     Types: Cigarettes   • Smokeless tobacco: Never Used   Substance and Sexual Activity   • Alcohol use: No   • Drug use: No   • Sexual activity: Defer       FAMILY HISTORY  Family History   Problem Relation Age of Onset   • Heart disease Father    • Cancer Mother        ALLERGIES  No Known Allergies    INPATIENT MEDICATIONS  Current Facility-Administered Medications   Medication Dose Route Frequency Provider Last Rate Last Dose   • amitriptyline (ELAVIL) tablet 10 mg  10 mg Oral Nightly Beckie Jacob PA       • cetirizine (zyrTEC) tablet 5 mg  5 mg Oral Daily Beckie Jacob PA       • dextrose (D50W) 25 g/ 50mL Intravenous Solution 25 g   25 g Intravenous Q15 Min PRN O'Beckie Pollard PA       • dextrose (GLUTOSE) oral gel 15 g  15 g Oral Q15 Min PRN O'Beckie Pollard PA       • fluticasone (FLONASE) 50 MCG/ACT nasal spray 2 spray  2 spray Nasal Daily PRN O'Beckie Pollard PA       • glucagon (human recombinant) (GLUCAGEN DIAGNOSTIC) injection 1 mg  1 mg Subcutaneous Q15 Min PRN O'Beckie Pollard PA       • heparin (porcine) 5000 UNIT/ML injection 5,000 Units  5,000 Units Subcutaneous Q12H O'Beckie Pollard PA       • insulin aspart (novoLOG) injection 0-7 Units  0-7 Units Subcutaneous TID AC ROOPA'Beckie Pollard PA       • Magnesium Sulfate 2 gram infusion - Mg less than or equal to 1.5 mg/dL  2 g Intravenous PRN ROOPA'Beckie Pollard PA        Or   • Magnesium Sulfate 1 gram infusion - Mg 1.6-1.9 mg/dL  1 g Intravenous PRN O'Beckie Pollard PA       • montelukast (SINGULAIR) tablet 10 mg  10 mg Oral Daily O'Beckie Pollard PA       • nitroglycerin (NITROSTAT) SL tablet 0.4 mg  0.4 mg Sublingual Q5 Min PRN O'Beckie Pollard PA       • pantoprazole (PROTONIX) EC tablet 40 mg  40 mg Oral Q AM O'Beckie Pollard PA       • polyethylene glycol (MIRALAX) powder 17 g  17 g Oral Daily PRN O'Beckie Pollard PA       • sodium chloride 0.9 % flush 10 mL  10 mL Intravenous Q12H O'Beckie Pollard PA   10 mL at 06/08/20 0217   • sodium chloride 0.9 % flush 10 mL  10 mL Intravenous PRN O'Beckie Pollard PA       • sodium chloride 0.9 % infusion  75 mL/hr Intravenous Continuous O'Beckie Pollard PA 75 mL/hr at 06/08/20 0217 75 mL/hr at 06/08/20 0217       REVIEW OF SYSTEMS  CONSTITUTIONAL:  No fever, chills, night sweats or fatigue.  EYES:  No blurry vision, diplopia or other vision changes.  ENT:  No hearing loss, nosebleeds or sore throat.  CARDIOVASCULAR:  No palpitations, arrhythmia, syncopal episodes or edema.  PULMONARY:  No hemoptysis, wheezing, chronic cough or shortness of breath.  GASTROINTESTINAL:  No nausea or vomiting.  No constipation or  "diarrhea.  No abdominal pain.  GENITOURINARY:  No hematuria, kidney stones or frequent urination.  MUSCULOSKELETAL:  No joint or back pains.  INTEGUMENTARY: No rashes or pruritus.  ENDOCRINE:  No excessive thirst or hot flashes.  HEMATOLOGIC:  No history of free bleeding, spontaneous bleeding or clotting.  IMMUNOLOGIC:  No allergies or frequent infections.  NEUROLOGIC: No numbness, tingling, seizures or weakness.  PSYCHIATRIC:  No anxiety or depression.    PHYSICAL EXAMINATION    /67 (BP Location: Left arm, Patient Position: Lying)   Pulse 67   Temp 97.9 °F (36.6 °C) (Oral)   Resp 18   Ht 162.6 cm (64\")   Wt 69.4 kg (153 lb 1.6 oz)   SpO2 92%   BMI 26.28 kg/m²     GENERAL:  A well-developed, well-nourished, white female in no acute distress.  HEENT:  Pupils equally round and reactive to light.  Extraocular muscles intact.  CARDIOVASCULAR:  Regular rate and rhythm.  No murmurs, gallops or rubs.  LUNGS:  Clear to auscultation bilaterally.  ABDOMEN:  Soft, nontender, nondistended with positive bowel sounds.  EXTREMITIES:  No clubbing, cyanosis or edema bilaterally.  SKIN:  No rashes or petechiae.  NEURO:  Cranial nerves grossly intact.  No focal deficits.  PSYCH:  Alert and oriented x3.    LABORATORY     WBC   Date Value Ref Range Status   06/08/2020 8.29 3.40 - 10.80 10*3/mm3 Final     RBC   Date Value Ref Range Status   06/08/2020 4.53 3.77 - 5.28 10*6/mm3 Final     Hemoglobin   Date Value Ref Range Status   06/08/2020 13.1 12.0 - 15.9 g/dL Final     Hematocrit   Date Value Ref Range Status   06/08/2020 42.6 34.0 - 46.6 % Final     MCV   Date Value Ref Range Status   06/08/2020 94.0 79.0 - 97.0 fL Final     MCH   Date Value Ref Range Status   06/08/2020 28.9 26.6 - 33.0 pg Final     MCHC   Date Value Ref Range Status   06/08/2020 30.8 (L) 31.5 - 35.7 g/dL Final     RDW   Date Value Ref Range Status   06/08/2020 15.2 12.3 - 15.4 % Final     RDW-SD   Date Value Ref Range Status   06/08/2020 52.8 37.0 - " 54.0 fl Final     MPV   Date Value Ref Range Status   06/08/2020 10.1 6.0 - 12.0 fL Final     Platelets   Date Value Ref Range Status   06/08/2020 167 140 - 450 10*3/mm3 Final     Neutrophil %   Date Value Ref Range Status   06/08/2020 56.2 42.7 - 76.0 % Final     Lymphocyte %   Date Value Ref Range Status   06/08/2020 29.2 19.6 - 45.3 % Final     Monocyte %   Date Value Ref Range Status   06/08/2020 12.4 (H) 5.0 - 12.0 % Final     Eosinophil %   Date Value Ref Range Status   06/08/2020 1.1 0.3 - 6.2 % Final     Basophil %   Date Value Ref Range Status   06/08/2020 0.5 0.0 - 1.5 % Final     Immature Grans %   Date Value Ref Range Status   06/08/2020 0.6 (H) 0.0 - 0.5 % Final     Neutrophils, Absolute   Date Value Ref Range Status   06/08/2020 4.66 1.70 - 7.00 10*3/mm3 Final     Lymphocytes, Absolute   Date Value Ref Range Status   06/08/2020 2.42 0.70 - 3.10 10*3/mm3 Final     Monocytes, Absolute   Date Value Ref Range Status   06/08/2020 1.03 (H) 0.10 - 0.90 10*3/mm3 Final     Eosinophils, Absolute   Date Value Ref Range Status   06/08/2020 0.09 0.00 - 0.40 10*3/mm3 Final     Basophils, Absolute   Date Value Ref Range Status   06/08/2020 0.04 0.00 - 0.20 10*3/mm3 Final     Immature Grans, Absolute   Date Value Ref Range Status   06/08/2020 0.05 0.00 - 0.05 10*3/mm3 Final     nRBC   Date Value Ref Range Status   06/08/2020 0.0 0.0 - 0.2 /100 WBC Final       Glucose   Date Value Ref Range Status   06/08/2020 165 (H) 65 - 99 mg/dL Final     Sodium   Date Value Ref Range Status   06/08/2020 137 136 - 145 mmol/L Final     Potassium   Date Value Ref Range Status   06/08/2020 4.3 3.5 - 5.2 mmol/L Final     CO2   Date Value Ref Range Status   06/08/2020 22.4 22.0 - 29.0 mmol/L Final     Chloride   Date Value Ref Range Status   06/08/2020 105 98 - 107 mmol/L Final     Anion Gap   Date Value Ref Range Status   06/08/2020 9.6 5.0 - 15.0 mmol/L Final     Creatinine   Date Value Ref Range Status   06/08/2020 2.36 (H) 0.57 -  1.00 mg/dL Final     BUN   Date Value Ref Range Status   06/08/2020 28 (H) 8 - 23 mg/dL Final     BUN/Creatinine Ratio   Date Value Ref Range Status   06/08/2020 11.9 7.0 - 25.0 Final     Calcium   Date Value Ref Range Status   06/08/2020 9.3 8.6 - 10.5 mg/dL Final     eGFR Non  Amer   Date Value Ref Range Status   06/08/2020 20 (L) >60 mL/min/1.73 Final     Alkaline Phosphatase   Date Value Ref Range Status   06/08/2020 141 (H) 39 - 117 U/L Final     Total Protein   Date Value Ref Range Status   06/08/2020 7.0 6.0 - 8.5 g/dL Final     ALT (SGPT)   Date Value Ref Range Status   06/08/2020 81 (H) 1 - 33 U/L Final     AST (SGOT)   Date Value Ref Range Status   06/08/2020 133 (H) 1 - 32 U/L Final     Total Bilirubin   Date Value Ref Range Status   06/08/2020 1.4 (H) 0.2 - 1.2 mg/dL Final     Albumin   Date Value Ref Range Status   06/08/2020 3.80 3.50 - 5.20 g/dL Final     Globulin   Date Value Ref Range Status   06/08/2020 3.2 gm/dL Final       Magnesium   Date Value Ref Range Status   06/08/2020 1.8 1.6 - 2.4 mg/dL Final     Phosphorus   Date Value Ref Range Status   06/08/2020 3.6 2.5 - 4.5 mg/dL Final     No results found for: LDH, URICACID     IMAGING  Imaging Results (Last 72 Hours)     ** No results found for the last 72 hours. **          PATHOLOGY  * No orders in the log *    IMPRESSION AND PLAN  Laxmi Higginbotham is a 69 y.o., white female with:  #1.-Acute renal failure secondary to apparent bilateral obstructing stones no imaging is been provided by [facility].  I will get a KUB today.  I am to put her in for emergent stent placement.    The patient was in agreement with these plans.    Thank you for asking us to participate in Laxmi Higginbotham's care.  We will continue to follow with you.  Please do not hesitate to call with any questions or concerns that you may have.    A total of 60 minutes were spent coordinating this patient’s care in clinic today; 30 minutes of which were face-to-face with the patient,  reviewing medical history and counseling on the current treatment and followup plan.  All questions were answered to patient's satisfaction.       This document was signed by No name on file. June 8, 2020 07:08

## 2020-06-08 NOTE — PROGRESS NOTES
69F PMH non insulin dependent type II diabetes mellitus, hyperlipidemia, GERD, breast cancer s/p right sided lumpectomy in the past, and history of nephrolithiasis s/p lithotripsy in the past that was transferred from OS ([facility] ED) for evaluation of abdominal pain following workup showing b/l nephrolithiasis, ureterolithiasis, and R sided hydronephrosis along w/ JAN.  I have reviewed H&P from today as well as all labs and images and evaluated the patient.  She is planned to go to OR today per Urology and will f/u after procedure.  Will continue plan as outlined per H&P today and follow tomorrow w/ formal note and updated plan.

## 2020-06-08 NOTE — PLAN OF CARE
Pt had cystoscopy with stent placement today. Doing well, no complaints of pain or discomfort. Pt has urinated after surgery. Will continue to monitor.

## 2020-06-08 NOTE — ANESTHESIA POSTPROCEDURE EVALUATION
Patient: Laxmi Higginbotham    Procedure Summary     Date:  06/08/20 Room / Location:  [facility] OR 06 /  [facility] OR    Anesthesia Start:  1142 Anesthesia Stop:  1220    Procedure:  CYSTOSCOPY& BILATERAL  URETEROSCOPY WITH LEFT STENT INSERTION & LASER LITHOTRIPSY OF THE RIGHT (Bilateral ) Diagnosis:       Nephrolithiasis      (Nephrolithiasis [N20.0])    Surgeon:  Darrick Alexis MD Provider:  Kyle Schumacher DO    Anesthesia Type:  general ASA Status:  2          Anesthesia Type: general    Vitals  Vitals Value Taken Time   /68 6/8/2020 12:52 PM   Temp 97.6 °F (36.4 °C) 6/8/2020 12:52 PM   Pulse 70 6/8/2020 12:52 PM   Resp 12 6/8/2020 12:52 PM   SpO2 100 % 6/8/2020 12:52 PM           Post Anesthesia Care and Evaluation    Patient location during evaluation: PHASE II  Patient participation: complete - patient participated  Level of consciousness: awake and alert  Pain score: 0  Pain management: adequate  Airway patency: patent  Anesthetic complications: No anesthetic complications  PONV Status: controlled  Cardiovascular status: acceptable  Respiratory status: acceptable and room air  Hydration status: euvolemic  No anesthesia care post op

## 2020-06-09 ENCOUNTER — NURSE TRIAGE (OUTPATIENT)
Dept: CALL CENTER | Facility: HOSPITAL | Age: 70
End: 2020-06-09

## 2020-06-09 VITALS
SYSTOLIC BLOOD PRESSURE: 150 MMHG | HEART RATE: 79 BPM | OXYGEN SATURATION: 94 % | DIASTOLIC BLOOD PRESSURE: 74 MMHG | BODY MASS INDEX: 26.14 KG/M2 | WEIGHT: 153.1 LBS | RESPIRATION RATE: 20 BRPM | TEMPERATURE: 99.2 F | HEIGHT: 64 IN

## 2020-06-09 DIAGNOSIS — N20.0 RENAL CALCULUS: Primary | ICD-10-CM

## 2020-06-09 LAB
ALBUMIN SERPL-MCNC: 3.23 G/DL (ref 3.5–5.2)
ALP SERPL-CCNC: 113 U/L (ref 39–117)
ALT SERPL W P-5'-P-CCNC: 68 U/L (ref 1–33)
ANION GAP SERPL CALCULATED.3IONS-SCNC: 8.1 MMOL/L (ref 5–15)
AST SERPL-CCNC: 45 U/L (ref 1–32)
BILIRUB CONJ SERPL-MCNC: 0.2 MG/DL (ref 0.2–0.3)
BILIRUB INDIRECT SERPL-MCNC: 0.3 MG/DL
BILIRUB SERPL-MCNC: 0.5 MG/DL (ref 0.2–1.2)
BUN BLD-MCNC: 25 MG/DL (ref 8–23)
BUN/CREAT SERPL: 9.8 (ref 7–25)
CALCIUM SPEC-SCNC: 8.2 MG/DL (ref 8.6–10.5)
CHLORIDE SERPL-SCNC: 105 MMOL/L (ref 98–107)
CO2 SERPL-SCNC: 19.9 MMOL/L (ref 22–29)
CREAT BLD-MCNC: 2.54 MG/DL (ref 0.57–1)
DEPRECATED RDW RBC AUTO: 51.8 FL (ref 37–54)
ERYTHROCYTE [DISTWIDTH] IN BLOOD BY AUTOMATED COUNT: 15 % (ref 12.3–15.4)
GFR SERPL CREATININE-BSD FRML MDRD: 19 ML/MIN/1.73
GLUCOSE BLD-MCNC: 160 MG/DL (ref 65–99)
GLUCOSE BLDC GLUCOMTR-MCNC: 143 MG/DL (ref 70–130)
GLUCOSE BLDC GLUCOMTR-MCNC: 148 MG/DL (ref 70–130)
HCT VFR BLD AUTO: 38.4 % (ref 34–46.6)
HGB BLD-MCNC: 12 G/DL (ref 12–15.9)
MAGNESIUM SERPL-MCNC: 2.1 MG/DL (ref 1.6–2.4)
MCH RBC QN AUTO: 29.1 PG (ref 26.6–33)
MCHC RBC AUTO-ENTMCNC: 31.3 G/DL (ref 31.5–35.7)
MCV RBC AUTO: 93.2 FL (ref 79–97)
PLATELET # BLD AUTO: 136 10*3/MM3 (ref 140–450)
PMV BLD AUTO: 10 FL (ref 6–12)
POTASSIUM BLD-SCNC: 4.9 MMOL/L (ref 3.5–5.2)
PROT SERPL-MCNC: 6.3 G/DL (ref 6–8.5)
RBC # BLD AUTO: 4.12 10*6/MM3 (ref 3.77–5.28)
SODIUM BLD-SCNC: 133 MMOL/L (ref 136–145)
WBC NRBC COR # BLD: 6.85 10*3/MM3 (ref 3.4–10.8)

## 2020-06-09 PROCEDURE — 99239 HOSP IP/OBS DSCHRG MGMT >30: CPT | Performed by: INTERNAL MEDICINE

## 2020-06-09 PROCEDURE — 25810000003 SODIUM CHLORIDE 0.9 % WITH KCL 20 MEQ 20-0.9 MEQ/L-% SOLUTION: Performed by: UROLOGY

## 2020-06-09 PROCEDURE — 80076 HEPATIC FUNCTION PANEL: CPT | Performed by: INTERNAL MEDICINE

## 2020-06-09 PROCEDURE — 82962 GLUCOSE BLOOD TEST: CPT

## 2020-06-09 PROCEDURE — 83735 ASSAY OF MAGNESIUM: CPT | Performed by: UROLOGY

## 2020-06-09 PROCEDURE — 80048 BASIC METABOLIC PNL TOTAL CA: CPT | Performed by: UROLOGY

## 2020-06-09 PROCEDURE — 99024 POSTOP FOLLOW-UP VISIT: CPT | Performed by: UROLOGY

## 2020-06-09 PROCEDURE — 85027 COMPLETE CBC AUTOMATED: CPT | Performed by: UROLOGY

## 2020-06-09 RX ORDER — PROMETHAZINE HYDROCHLORIDE 25 MG/1
25 TABLET ORAL EVERY 4 HOURS PRN
Status: DISCONTINUED | OUTPATIENT
Start: 2020-06-09 | End: 2020-06-19

## 2020-06-09 RX ORDER — GENTAMICIN SULFATE 80 MG/100ML
80 INJECTION, SOLUTION INTRAVENOUS ONCE
Status: CANCELLED | OUTPATIENT
Start: 2020-06-19 | End: 2020-06-09

## 2020-06-09 RX ORDER — OXYCODONE HYDROCHLORIDE AND ACETAMINOPHEN 5; 325 MG/1; MG/1
TABLET ORAL
Qty: 28 TABLET | Refills: 0 | Status: SHIPPED | OUTPATIENT
Start: 2020-06-09 | End: 2020-06-17

## 2020-06-09 RX ADMIN — ACETAMINOPHEN 650 MG: 325 TABLET ORAL at 00:54

## 2020-06-09 RX ADMIN — MONTELUKAST SODIUM 10 MG: 10 TABLET, FILM COATED ORAL at 08:07

## 2020-06-09 RX ADMIN — SODIUM CHLORIDE AND POTASSIUM CHLORIDE 100 ML/HR: .9; .15 SOLUTION INTRAVENOUS at 02:07

## 2020-06-09 RX ADMIN — CETIRIZINE HYDROCHLORIDE 5 MG: 10 TABLET, FILM COATED ORAL at 08:07

## 2020-06-09 NOTE — TELEPHONE ENCOUNTER
"She had stones crushed and stent placed yesterday, sent home with no pain meds and is dry heaving and pain rated 7-8, bent over hurting, told to call surgeon, if cannot get hold of him go to ER    Reason for Disposition  • [1] SEVERE post-op pain (e.g., excruciating, pain scale 8-10) AND [2] not controlled with pain medications    Additional Information  • Negative: Sounds like a life-threatening emergency to the triager  • Negative: Chest pain  • Negative: Difficulty breathing  • Negative: Surgical incision symptoms and questions  • Negative: [1] Discomfort (pain, burning or stinging) when passing urine AND [2] male  • Negative: [1] Discomfort (pain, burning or stinging) when passing urine AND [2] female  • Negative: Constipation  • Negative: New or worsening leg (calf, thigh) pain  • Negative: New or worsening leg swelling  • Negative: Dizziness is severe, or persists > 24 hours after surgery  • Negative: Pain, redness, swelling, or pus at IV Site  • Negative: Symptoms arising from use of a urinary catheter (Tapia or Coude)  • Negative: Cast problems or questions  • Negative: Medication question  • Negative: [1] Widespread rash AND [2] bright red, sunburn-like  • Negative: [1] SEVERE headache AND [2] after spinal (epidural) anesthesia  • Negative: [1] Vomiting AND [2] persists > 4 hours  • Negative: [1] Vomiting AND [2] abdomen looks much more swollen than usual  • Negative: [1] Drinking very little AND [2] dehydration suspected (e.g., no urine > 12 hours, very dry mouth, very lightheaded)  • Negative: Patient sounds very sick or weak to the triager  • Negative: Sounds like a serious complication to the triager  • Negative: Fever > 100.4 F (38.0 C)    Answer Assessment - Initial Assessment Questions  1. SYMPTOM: \"What's the main symptom you're concerned about?\" (e.g., pain, fever, vomiting)      Pain rated 7-8  2. ONSET: \"When did pain  start?\"      On way home from hospital  3. SURGERY: \"What surgery was " "performed?\"      Crushed stones and urinary stent placed  4. DATE of SURGERY: \"When was surgery performed?\"       6/8  5. ANESTHESIA: \" What type of anesthesia did you have?\" (e.g., general, spinal, epidural, local)      general  6. PAIN: \"Is there any pain?\" If so, ask: \"How bad is it?\"  (Scale 1-10; or mild, moderate, severe)      Pain in abd. Rated 7-8 and vomiting  7. FEVER: \"Do you have a fever?\" If so, ask: \"What is your temperature, how was it measured, and when did it start?\"      no  8. VOMITING: \"Is there any vomiting?\" If yes, ask: \"How many times?\"      Yes and dry heaving all time  9. BLEEDING: \"Is there any bleeding?\" If so, ask: \"How much?\" and \"Where?\"      no  10. OTHER SYMPTOMS: \"Do you have any other symptoms?\" (e.g., drainage from wound, painful urination, constipation)        Dry heaving, and pink tinged urine pain uncontrolled rated 7-8    Protocols used: POST-OP SYMPTOMS AND QUESTIONS-ADULT-      "

## 2020-06-09 NOTE — DISCHARGE SUMMARY
[facility] HOSPITALISTS DISCHARGE SUMMARY    Patient Identification:  Name:  Laxmi Higginbotham  Age:  69 y.o.  Sex:  female  :  1950  MRN:  [id number]  Visit Number:  [id number]    Date of Admission: 2020  Date of Discharge:  2020     PCP: Amanda Pozo MD    DISCHARGE DIAGNOSIS  B/l Nephrolithiasis   Ureterolithiasis  Mod R Hydronephrosis  Obstructive Uropathy  JAN w/ possible underlying CKD  NIDDM Type II, controlled  HTN  HLD  GERD  Prolonged QTc  Hx Breast Cancer s/p R lumpectomy  Hx Tobacco Dependence    CONSULTS   Urology    PROCEDURES PERFORMED  Cystoscopy  B/l Ureteroscopy  L stent insertion  R laser lithotripsy    HOSPITAL COURSE  Patient is a 69 y.o. female presented to [facility] complaining of flank pain.  Please see the admitting history and physical for further details.  Imaging obtained on admission showing b/l nephrolithiasis, ureterolithiasis and mod R sided hydronephrosis.  Also of note patient noted to have Cr elevation up to 2.54 today and was 1.53 back in 2019.  Patient received gentle fluids while inpatient.  Urology was consulted and performed cystoscopy, b/l ureteroscopy, L sided stent insertion and R sided laser lithotripsy.  Today her symptoms are all but resolved, her UOP is good, although Cr did slightly increase from 2.36 yesterday.  Her stone analysis is still pending as well. Patient was evaluated by Dr. Alexis this AM and felt she was stable for discharge and he plans to see her back in clinic on the  for stent removal and repeat chemistry panel.  He additionally reports she does not need any Abx therapy at discharge.  Patient's LFTs were slightly elevated on admission as were lipase but clinically no symptoms c/w hepatitis or pancreatitis.  Acute hepatitis panel was negative.  Repeat labs this AM improved.  Have held home statin at discharge. BP has been stable and have held home ACEI at discharge.  Additionally treated her DM w/ FSBG  and SSI while inpatient, held home Farxiga at discharge due to JAN, lowered home Januvia dosing discharge.  She was continued on home PPI.  Patient will follow up w/ her PCP w/in 1 week post hospital discharge for BMP check and blood pressure check.    VITAL SIGNS:  Temp:  [97.6 °F (36.4 °C)-99.7 °F (37.6 °C)] 99.2 °F (37.3 °C)  Heart Rate:  [66-86] 79  Resp:  [9-20] 20  BP: (116-161)/(60-92) 150/74  SpO2:  [90 %-100 %] 94 %  on  Flow (L/min):  [2-4] 2;   Device (Oxygen Therapy): room air    Body mass index is 26.28 kg/m².  Wt Readings from Last 3 Encounters:   06/08/20 69.4 kg (153 lb 1.6 oz)   05/17/19 74.3 kg (163 lb 12.8 oz)   04/23/19 70.3 kg (155 lb)       PHYSICAL EXAM:  Constitutional:  Well-developed and well-nourished.  No acute distress.      HENT:  Head:  Normocephalic and atraumatic.  Mouth:  Moist mucous membranes.    Eyes:  Conjunctivae and EOM are normal.  No scleral icterus.    Neck:  Neck supple.  No JVD present.    Cardiovascular:  Normal rate, regular rhythm and normal heart sounds with no murmur.  Pulmonary/Chest:  No respiratory distress, no wheezes, no crackles  Abdominal:  Soft.  No distension and no tenderness.   Musculoskeletal:  No tenderness, and no deformity.  No red or swollen joints anywhere.    Neurological:  Alert and oriented to person, place, and time.  No cranial nerve deficit.    Skin:  Skin is warm and dry. No rash noted. No pallor.   Peripheral vascular: no clubbing, no cyanosis, no edema.    DISCHARGE DISPOSITION   Stable    DISCHARGE MEDICATIONS:     Discharge Medications      Changes to Medications      Instructions Start Date   SITagliptin 25 MG tablet  Commonly known as:  JANUVIA  What changed:    · medication strength  · how much to take   25 mg, Oral, Daily         Continue These Medications      Instructions Start Date   amitriptyline 10 MG tablet  Commonly known as:  ELAVIL   10 mg, Oral, Nightly      cetirizine 10 MG tablet  Commonly known as:  zyrTEC   10 mg, Oral,  Daily      denosumab 60 MG/ML solution prefilled syringe syringe  Commonly known as:  PROLIA   60 mg, Subcutaneous, Every 6 Months      fluticasone 50 MCG/ACT nasal spray  Commonly known as:  FLONASE   2 sprays, Nasal, Daily PRN      montelukast 10 MG tablet  Commonly known as:  SINGULAIR   10 mg, Oral, Daily      pantoprazole 40 MG EC tablet  Commonly known as:  PROTONIX   40 mg, Oral, Daily      polyethylene glycol packet  Commonly known as:  MIRALAX   17 g, Oral, Daily PRN      rizatriptan 10 MG tablet  Commonly known as:  MAXALT   10 mg, Oral, Once As Needed, May repeat in 2 hours if needed         Stop These Medications    aspirin-acetaminophen-caffeine 250-250-65 MG per tablet  Commonly known as:  EXCEDRIN MIGRAINE     DICLOFENAC SODIUM PO     Farxiga 5 MG tablet tablet  Generic drug:  dapagliflozin     lisinopril-hydrochlorothiazide 20-25 MG per tablet  Commonly known as:  PRINZIDE,ZESTORETIC            Activity Instructions     Activity as Tolerated          Additional Instructions for the Follow-ups that You Need to Schedule     Discharge Follow-up with PCP   As directed       Currently Documented PCP:    Amanda Pozo MD    PCP Phone Number:    [phone]     Follow Up Details:  1 week post hospital discharge, check BMP and Blood pressure.         Discharge Follow-up with Specified Provider: Dr. Alexis office visit June 18 for Stent removal and lithotripsy, time TBD per Urology office   As directed      To:  Dr. Alexis office visit June 18 for Stent removal and lithotripsy, time TBD per Urology office           Follow-up Information     Amanda Pozo MD Follow up.    Specialty:  Internal Medicine  Contact information:  [address] [place] [address] [phone]             Amanda Pozo MD .    Specialty:  Internal Medicine  Why:  1 week post hospital discharge, check BMP and Blood pressure.  Contact information:  [address] [place] [address] [phone]                  TEST   RESULTS PENDING AT DISCHARGE   Order Current Status    STONE ANALYSIS - Calculus, Ureter, Right In process        CODE STATUS  Code Status and Medical Interventions:   Ordered at: 06/08/20 1218     Level Of Support Discussed With:    Patient     Code Status:    CPR     Medical Interventions (Level of Support Prior to Arrest):    Full     Aurelio Key MD  [facility] Hospitalist  06/09/20  11:00    Please note that this discharge summary required more than 30 minutes to complete.

## 2020-06-09 NOTE — PLAN OF CARE
Patient has rested well throughout the night. Only complaint of pain was from a headache, controlled with prn tylenol. Patient has been ambulating and urinating throughout the shift. Will continue to monitor.

## 2020-06-09 NOTE — DISCHARGE INSTR - APPOINTMENTS
Please call as soon as possible to schedule an appointment to see Dr. Pozo in one week.  You can reach the office at .

## 2020-06-09 NOTE — PROGRESS NOTES
POD # 1 s/p ureteroscopy and left stent placement.  Her vital signs are stable she is afebrile.  She feels great my recommendation is that she be released today.  I will set her up for appropriate lithotripsy on the left and stent removal on June 18 and I will arrange this I will want to see her in the office and I will leave an appropriate appointment time.  She may be released she does not need antibiotics.  And I will monitor her repeat renal function when I see her in the office next week.  Vanesa

## 2020-06-10 ENCOUNTER — READMISSION MANAGEMENT (OUTPATIENT)
Dept: CALL CENTER | Facility: HOSPITAL | Age: 70
End: 2020-06-10

## 2020-06-10 ENCOUNTER — TELEPHONE (OUTPATIENT)
Dept: UROLOGY | Facility: CLINIC | Age: 70
End: 2020-06-10

## 2020-06-10 NOTE — TELEPHONE ENCOUNTER
The patient called and said [facility] would not give them the medications we sent in yesterday for her. I called [facility] and they said they called us and spoke to someone else and got that straightened out. I called the pt back and told her and she said that she was passing some blood in her stool since she was constipated and then had explosive diarrhea. She did tell me she had diverticulitis I told her that with the narcotic pain meds its constipated her and now its upset her diverticula and if that doesn't get better to call back and maybe follow up with her GI doc as well.

## 2020-06-10 NOTE — OUTREACH NOTE
Prep Survey      Responses   [facility]   Is LACE score < 7 ?  No   Eligibility  Readm Mgmt   Discharge diagnosis  B/l Nephrolithiasis    Does the patient have one of the following disease processes/diagnoses(primary or secondary)?  Other   Does the patient have Home health ordered?  No   Is there a DME ordered?  No   Prep survey completed?  Yes          Abbey Dover RN

## 2020-06-11 ENCOUNTER — READMISSION MANAGEMENT (OUTPATIENT)
Dept: CALL CENTER | Facility: HOSPITAL | Age: 70
End: 2020-06-11

## 2020-06-11 NOTE — OUTREACH NOTE
Medical Week 1 Survey      Responses   [facility] patient discharged from?  Too   COVID-19 Test Status  Not tested   Does the patient have one of the following disease processes/diagnoses(primary or secondary)?  Other   Is there a successful TCM telephone encounter documented?  No   Week 1 attempt successful?  No   Unsuccessful attempts  Attempt 1          Crystal Grady RN

## 2020-06-12 ENCOUNTER — READMISSION MANAGEMENT (OUTPATIENT)
Dept: CALL CENTER | Facility: HOSPITAL | Age: 70
End: 2020-06-12

## 2020-06-12 NOTE — OUTREACH NOTE
Medical Week 1 Survey      Responses   [facility] patient discharged from?  Too   COVID-19 Test Status  Not tested   Does the patient have one of the following disease processes/diagnoses(primary or secondary)?  Other   Is there a successful TCM telephone encounter documented?  No   Week 1 attempt successful?  Yes   Call start time  1409   Call end time  1415   Discharge diagnosis  B/l Nephrolithiasis    Is patient permission given to speak with other caregiver?  Yes   Meds reviewed with patient/caregiver?  Yes   Is the patient having any side effects they believe may be caused by any medication additions or changes?  Yes   Side effects comments   states she has had some dizziness   Does the patient have all medications ordered at discharge?  Yes   Is the patient taking all medications as directed (includes completed medication regime)?  Yes   Does the patient have a primary care provider?   Yes   Does the patient have an appointment with their PCP within 7 days of discharge?  Yes   Comments regarding PCP  Dr. Pozo   Has the patient kept scheduled appointments due by today?  N/A   Has home health visited the patient within 72 hours of discharge?  N/A   Pulse Ox monitoring  None   Psychosocial issues?  No   Did the patient receive a copy of their discharge instructions?  Yes   Nursing interventions  Reviewed instructions with patient   What is the patient's perception of their health status since discharge?  Improving   Is the patient/caregiver able to teach back signs and symptoms related to disease process for when to call PCP?  Yes   Is the patient/caregiver able to teach back signs and symptoms related to disease process for when to call 911?  Yes   Is the patient/caregiver able to teach back the hierarchy of who to call/visit for symptoms/problems? PCP, Specialist, Home health nurse, Urgent Care, ED, 911  Yes   Week 1 call completed?  Yes   Wrap up additional comments  States she is doing better.  Denies any needs at this time          Evangelina Duron RN

## 2020-06-17 ENCOUNTER — LAB (OUTPATIENT)
Dept: LAB | Facility: HOSPITAL | Age: 70
End: 2020-06-17

## 2020-06-17 ENCOUNTER — TRANSCRIBE ORDERS (OUTPATIENT)
Dept: UROLOGY | Facility: CLINIC | Age: 70
End: 2020-06-17

## 2020-06-17 ENCOUNTER — APPOINTMENT (OUTPATIENT)
Dept: PREADMISSION TESTING | Facility: HOSPITAL | Age: 70
End: 2020-06-17

## 2020-06-17 ENCOUNTER — READMISSION MANAGEMENT (OUTPATIENT)
Dept: CALL CENTER | Facility: HOSPITAL | Age: 70
End: 2020-06-17

## 2020-06-17 DIAGNOSIS — Z01.818 PRE-OPERATIVE CLEARANCE: ICD-10-CM

## 2020-06-17 DIAGNOSIS — Z01.818 PRE-OPERATIVE CLEARANCE: Primary | ICD-10-CM

## 2020-06-17 PROCEDURE — U0004 COV-19 TEST NON-CDC HGH THRU: HCPCS

## 2020-06-17 PROCEDURE — C9803 HOPD COVID-19 SPEC COLLECT: HCPCS

## 2020-06-17 PROCEDURE — U0002 COVID-19 LAB TEST NON-CDC: HCPCS

## 2020-06-17 NOTE — OUTREACH NOTE
Medical Week 2 Survey      Responses   [facility] patient discharged from?  Too   COVID-19 Test Status  Negative   Does the patient have one of the following disease processes/diagnoses(primary or secondary)?  Other   Week 2 attempt successful?  No   Unsuccessful attempts  Attempt 1          Maxine Duron RN

## 2020-06-17 NOTE — DISCHARGE INSTRUCTIONS
TAKE the following medications the morning of surgery:    All heart or blood pressure medications    Please discontinue all blood thinners and anticoagulants (except aspirin) prior to surgery as per your surgeon and cardiologist instructions.  Aspirin may be continued up to the day prior to surgery.    HOLD all diabetic medications the morning of surgery as order by physician.    Arrival time for surgery on 6/19/20 will be given to you by Dr. Alexis's office.    A RESPONSIBLE PERSON MUST REMAIN IN THE WAITING ROOM DURING YOUR PROCEDURE AND A RESPONSIBLE  MUST BE AVAILABLE UPON YOUR DISCHARGE.    General Instructions:  • Do NOT eat or drink after midnight 6/18/20 which includes water, mints, or gum.  • You may brush your teeth. Dental appliances that are removable must be taken out day of surgery.  • Do NOT smoke, chew tobacco, or drink alcohol within 24 hours prior to surgery.  • Bring medications in original bottles, any inhalers and if applicable your C-PAP/BI-PAP machine  • Bring any papers given to you in the doctor’s office  • Wear clean, comfortable clothes and socks  • Do NOT wear contact lenses or make-up or dark nail polish.  Bring a case for your glasses if applicable.  • Bring crutches or walker if applicable  • Leave all other valuables and jewelry at home  • If you were given a blood bank armband, continue to wear it until discharged.    Preventing a Surgical Site Infection:  • Shower the night before surgery (unless instructed otherwise) using a fresh bar of anti-bacterial soap (such as Dial) and clean washcloth.  Dry with a clean towel and dress in clean clothing.  • For 2 to 3 days before surgery, avoid shaving with a razor near where you will have surgery because the razor can irritate skin and make it easier to develop an infection.  Ask your surgeon if you will be receiving antibiotics prior to surgery.  • Make sure you, your family, and all healthcare providers clean their hands with  soap and water or an alcohol-based hand  before caring for you or your wound.  • If at all possible, quit smoking as many days before surgery as you can.    Day of Surgery:  Upon arrival, a pre-op nurse and anesthesiologist will review your health history, obtain vital signs, and answer questions you may have.  The only belongings needed at this time will be your home medications and if applicable you C-PAP/BI-PAP machine.  If you are staying overnight, your family can leave the rest of your belongings in the car and bring them to your room later.  A pre-op nurse will start an IV and you may receive medication in preparation for surgery.  Due to patient privacy and limited space, only one member of your family will be able to accompany you in the pre-op area.  While you are in surgery your family should notify the waiting room  if they leave the waiting room area and provide a contact number.  Please be aware that surgery does come with discomfort.  We want to make every effort to control your discomfort so please discuss any uncontrolled symptoms with your nurse.  Your doctor will most likely have prescribed pain medications.  If you are going home after surgery you will receive individualized written care instructions before being discharged.  A responsible adult must drive you to and from the hospital on the day of surgery and stay with you for 24 hours.  If you are staying overnight following surgery, you will be transported to your hospital room following the recovery period.

## 2020-06-18 LAB
REF LAB TEST METHOD: NORMAL
SARS-COV-2 RNA RESP QL NAA+PROBE: NOT DETECTED

## 2020-06-19 ENCOUNTER — ANESTHESIA EVENT (OUTPATIENT)
Dept: PERIOP | Facility: HOSPITAL | Age: 70
End: 2020-06-19

## 2020-06-19 ENCOUNTER — HOSPITAL ENCOUNTER (OUTPATIENT)
Facility: HOSPITAL | Age: 70
Setting detail: HOSPITAL OUTPATIENT SURGERY
Discharge: HOME | End: 2020-06-19
Attending: UROLOGY | Admitting: UROLOGY

## 2020-06-19 ENCOUNTER — ANESTHESIA (OUTPATIENT)
Dept: PERIOP | Facility: HOSPITAL | Age: 70
End: 2020-06-19

## 2020-06-19 ENCOUNTER — READMISSION MANAGEMENT (OUTPATIENT)
Dept: CALL CENTER | Facility: HOSPITAL | Age: 70
End: 2020-06-19

## 2020-06-19 VITALS
HEIGHT: 64 IN | DIASTOLIC BLOOD PRESSURE: 76 MMHG | RESPIRATION RATE: 18 BRPM | WEIGHT: 145 LBS | OXYGEN SATURATION: 98 % | HEART RATE: 75 BPM | TEMPERATURE: 98.1 F | BODY MASS INDEX: 24.75 KG/M2 | SYSTOLIC BLOOD PRESSURE: 128 MMHG

## 2020-06-19 DIAGNOSIS — N20.0 NEPHROLITHIASIS: Primary | ICD-10-CM

## 2020-06-19 LAB
COLOR STONE: NORMAL
COM CRY STONE QL IR: 100 %
COMPN STONE: NORMAL
GLUCOSE BLDC GLUCOMTR-MCNC: 159 MG/DL (ref 70–130)
LABORATORY COMMENT REPORT: NORMAL
Lab: NORMAL
Lab: NORMAL
PHOTO: NORMAL
SIZE STONE: NORMAL MM
SPECIMEN SOURCE: NORMAL
WT STONE: 6 MG

## 2020-06-19 PROCEDURE — 25010000002 PROPOFOL 10 MG/ML EMULSION: Performed by: NURSE ANESTHETIST, CERTIFIED REGISTERED

## 2020-06-19 PROCEDURE — 25010000002 ONDANSETRON PER 1 MG: Performed by: NURSE ANESTHETIST, CERTIFIED REGISTERED

## 2020-06-19 PROCEDURE — 25010000002 KETOROLAC TROMETHAMINE PER 15 MG: Performed by: NURSE ANESTHETIST, CERTIFIED REGISTERED

## 2020-06-19 PROCEDURE — 25010000002 GENTAMICIN PER 80 MG: Performed by: UROLOGY

## 2020-06-19 PROCEDURE — 25010000002 DEXAMETHASONE PER 1 MG: Performed by: NURSE ANESTHETIST, CERTIFIED REGISTERED

## 2020-06-19 PROCEDURE — 82962 GLUCOSE BLOOD TEST: CPT

## 2020-06-19 PROCEDURE — 25010000002 FENTANYL CITRATE (PF) 100 MCG/2ML SOLUTION: Performed by: NURSE ANESTHETIST, CERTIFIED REGISTERED

## 2020-06-19 PROCEDURE — 52310 CYSTOSCOPY AND TREATMENT: CPT | Performed by: UROLOGY

## 2020-06-19 PROCEDURE — 50590 FRAGMENTING OF KIDNEY STONE: CPT | Performed by: UROLOGY

## 2020-06-19 RX ORDER — SODIUM CHLORIDE, SODIUM LACTATE, POTASSIUM CHLORIDE, CALCIUM CHLORIDE 600; 310; 30; 20 MG/100ML; MG/100ML; MG/100ML; MG/100ML
125 INJECTION, SOLUTION INTRAVENOUS CONTINUOUS
Status: DISCONTINUED | OUTPATIENT
Start: 2020-06-19 | End: 2020-06-19 | Stop reason: HOSPADM

## 2020-06-19 RX ORDER — OXYCODONE HYDROCHLORIDE AND ACETAMINOPHEN 5; 325 MG/1; MG/1
1 TABLET ORAL ONCE AS NEEDED
Status: DISCONTINUED | OUTPATIENT
Start: 2020-06-19 | End: 2020-06-19 | Stop reason: HOSPADM

## 2020-06-19 RX ORDER — SODIUM CHLORIDE 0.9 % (FLUSH) 0.9 %
10 SYRINGE (ML) INJECTION EVERY 12 HOURS SCHEDULED
Status: DISCONTINUED | OUTPATIENT
Start: 2020-06-19 | End: 2020-06-19 | Stop reason: HOSPADM

## 2020-06-19 RX ORDER — SODIUM CHLORIDE 0.9 % (FLUSH) 0.9 %
10 SYRINGE (ML) INJECTION AS NEEDED
Status: DISCONTINUED | OUTPATIENT
Start: 2020-06-19 | End: 2020-06-19 | Stop reason: HOSPADM

## 2020-06-19 RX ORDER — MIDAZOLAM HYDROCHLORIDE 1 MG/ML
1 INJECTION INTRAMUSCULAR; INTRAVENOUS
Status: DISCONTINUED | OUTPATIENT
Start: 2020-06-19 | End: 2020-06-19 | Stop reason: HOSPADM

## 2020-06-19 RX ORDER — KETOROLAC TROMETHAMINE 30 MG/ML
INJECTION, SOLUTION INTRAMUSCULAR; INTRAVENOUS AS NEEDED
Status: DISCONTINUED | OUTPATIENT
Start: 2020-06-19 | End: 2020-06-19 | Stop reason: SURG

## 2020-06-19 RX ORDER — FENTANYL CITRATE 50 UG/ML
INJECTION, SOLUTION INTRAMUSCULAR; INTRAVENOUS AS NEEDED
Status: DISCONTINUED | OUTPATIENT
Start: 2020-06-19 | End: 2020-06-19 | Stop reason: SURG

## 2020-06-19 RX ORDER — FENTANYL CITRATE 50 UG/ML
50 INJECTION, SOLUTION INTRAMUSCULAR; INTRAVENOUS
Status: DISCONTINUED | OUTPATIENT
Start: 2020-06-19 | End: 2020-06-19 | Stop reason: HOSPADM

## 2020-06-19 RX ORDER — PROPOFOL 10 MG/ML
VIAL (ML) INTRAVENOUS AS NEEDED
Status: DISCONTINUED | OUTPATIENT
Start: 2020-06-19 | End: 2020-06-19 | Stop reason: SURG

## 2020-06-19 RX ORDER — DEXAMETHASONE SODIUM PHOSPHATE 4 MG/ML
INJECTION, SOLUTION INTRA-ARTICULAR; INTRALESIONAL; INTRAMUSCULAR; INTRAVENOUS; SOFT TISSUE AS NEEDED
Status: DISCONTINUED | OUTPATIENT
Start: 2020-06-19 | End: 2020-06-19 | Stop reason: SURG

## 2020-06-19 RX ORDER — OXYCODONE AND ACETAMINOPHEN 10; 325 MG/1; MG/1
1 TABLET ORAL EVERY 4 HOURS PRN
Qty: 16 TABLET | Refills: 0 | Status: SHIPPED | OUTPATIENT
Start: 2020-06-19

## 2020-06-19 RX ORDER — ATORVASTATIN CALCIUM 40 MG/1
40 TABLET, FILM COATED ORAL DAILY
COMMUNITY

## 2020-06-19 RX ORDER — MEPERIDINE HYDROCHLORIDE 25 MG/ML
12.5 INJECTION INTRAMUSCULAR; INTRAVENOUS; SUBCUTANEOUS
Status: DISCONTINUED | OUTPATIENT
Start: 2020-06-19 | End: 2020-06-19 | Stop reason: HOSPADM

## 2020-06-19 RX ORDER — LEVOFLOXACIN 250 MG/1
250 TABLET ORAL DAILY
COMMUNITY

## 2020-06-19 RX ORDER — LIDOCAINE HYDROCHLORIDE 20 MG/ML
INJECTION, SOLUTION INFILTRATION; PERINEURAL AS NEEDED
Status: DISCONTINUED | OUTPATIENT
Start: 2020-06-19 | End: 2020-06-19 | Stop reason: SURG

## 2020-06-19 RX ORDER — PROMETHAZINE HYDROCHLORIDE 12.5 MG/1
12.5 TABLET ORAL EVERY 6 HOURS PRN
COMMUNITY

## 2020-06-19 RX ORDER — ONDANSETRON 2 MG/ML
INJECTION INTRAMUSCULAR; INTRAVENOUS AS NEEDED
Status: DISCONTINUED | OUTPATIENT
Start: 2020-06-19 | End: 2020-06-19 | Stop reason: SURG

## 2020-06-19 RX ORDER — MAGNESIUM HYDROXIDE 1200 MG/15ML
LIQUID ORAL AS NEEDED
Status: DISCONTINUED | OUTPATIENT
Start: 2020-06-19 | End: 2020-06-19 | Stop reason: HOSPADM

## 2020-06-19 RX ORDER — ONDANSETRON 2 MG/ML
4 INJECTION INTRAMUSCULAR; INTRAVENOUS AS NEEDED
Status: DISCONTINUED | OUTPATIENT
Start: 2020-06-19 | End: 2020-06-19 | Stop reason: HOSPADM

## 2020-06-19 RX ORDER — GENTAMICIN SULFATE 80 MG/100ML
80 INJECTION, SOLUTION INTRAVENOUS ONCE
Status: COMPLETED | OUTPATIENT
Start: 2020-06-19 | End: 2020-06-19

## 2020-06-19 RX ORDER — FAMOTIDINE 10 MG/ML
INJECTION, SOLUTION INTRAVENOUS AS NEEDED
Status: DISCONTINUED | OUTPATIENT
Start: 2020-06-19 | End: 2020-06-19 | Stop reason: SURG

## 2020-06-19 RX ORDER — IPRATROPIUM BROMIDE AND ALBUTEROL SULFATE 2.5; .5 MG/3ML; MG/3ML
3 SOLUTION RESPIRATORY (INHALATION) ONCE AS NEEDED
Status: DISCONTINUED | OUTPATIENT
Start: 2020-06-19 | End: 2020-06-19 | Stop reason: HOSPADM

## 2020-06-19 RX ADMIN — ONDANSETRON 4 MG: 2 INJECTION INTRAMUSCULAR; INTRAVENOUS at 11:35

## 2020-06-19 RX ADMIN — GENTAMICIN SULFATE 80 MG: 80 INJECTION, SOLUTION INTRAVENOUS at 11:35

## 2020-06-19 RX ADMIN — FENTANYL CITRATE 50 MCG: 50 INJECTION INTRAMUSCULAR; INTRAVENOUS at 12:03

## 2020-06-19 RX ADMIN — KETOROLAC TROMETHAMINE 30 MG: 30 INJECTION, SOLUTION INTRAMUSCULAR; INTRAVENOUS at 11:35

## 2020-06-19 RX ADMIN — PROPOFOL 160 MG: 10 INJECTION, EMULSION INTRAVENOUS at 11:40

## 2020-06-19 RX ADMIN — FENTANYL CITRATE 50 MCG: 50 INJECTION INTRAMUSCULAR; INTRAVENOUS at 11:35

## 2020-06-19 RX ADMIN — SODIUM CHLORIDE, POTASSIUM CHLORIDE, SODIUM LACTATE AND CALCIUM CHLORIDE: 600; 310; 30; 20 INJECTION, SOLUTION INTRAVENOUS at 11:35

## 2020-06-19 RX ADMIN — FAMOTIDINE 20 MG: 10 INJECTION INTRAVENOUS at 11:35

## 2020-06-19 RX ADMIN — DEXAMETHASONE SODIUM PHOSPHATE 8 MG: 4 INJECTION, SOLUTION INTRAMUSCULAR; INTRAVENOUS at 11:40

## 2020-06-19 RX ADMIN — LIDOCAINE HYDROCHLORIDE 40 MG: 20 INJECTION, SOLUTION INFILTRATION; PERINEURAL at 11:40

## 2020-06-19 NOTE — OP NOTE
EXTRACORPOREAL SHOCKWAVE LITHOTRIPSY, CYSTOSCOPY URETERAL STENT REMOVAL  Procedure Note    Laxmi Higginbotham  6/19/2020    Pre-op Diagnosis:   Nephrolithiasis [N20.0]    Post-op Diagnosis:     Post-Op Diagnosis Codes:     * Nephrolithiasis [N20.0]    Procedure/CPT® Codes:  69-year-old white female status post a right-sided ureteroscopy stone removal and a left stone that is in the lower pole calyx with a stent she is now here for lithotripsy and stent removal following informed consent she is brought the operative suite.  ESWL-the patient is a candidate for extracorporeal shockwave lithotripsy.  We discussed the type of stone.  And the complications associated with the procedure including but not limited to pain in the flank, hematoma, spontaneous renal hemorrhage, and adequate fragmentation of stones.  The need for passage of the stones.  The need for concomitant additional procedures in the range of 24% the risk of a distal fragment in the range of 3% requiring ureteroscopic removal..  Fact that sometimes a stent is indicated based on the size and the density of the stone as determined on the CAT scan.  Following an informed consent he is brought to the operative suite and underwent induction of general endotracheal anesthetic the stone was localized at F2 and a total of 3000 shockwaves administered without complication.  Prepped and draped in sterile fashion used a 21 Bulgarian cystoscope advanced into the bladder and remove the stent from the left side without complication she had excellent fragmentation there was excellent fragmentation he was awake and alert return to recovery room          Procedure(s):  EXTRACORPOREAL SHOCKWAVE LITHOTRIPSY  CYSTOSCOPY URETERAL STENT REMOVAL    Surgeon(s):  Darrick Alexis MD    Anesthesia: see anesthesia record    Staff:   Circulator: Monica Nichols RN  Scrub Person: Jordana Pham Tina    Estimated Blood Loss: none  Urine Voided: * No values recorded  between 6/19/2020 11:36 AM and 6/19/2020 12:10 PM *    Specimens:                None      Drains: None    Findings: Good fragmentation, stent removed    Blood: N/A    Complications: None    Grafts and Implants: None    Darrick Alexis MD     Date: 6/19/2020  Time: 12:10

## 2020-06-19 NOTE — OUTREACH NOTE
Medical Week 2 Survey      Responses   [facility] patient discharged from?  Too   COVID-19 Test Status  Negative   Does the patient have one of the following disease processes/diagnoses(primary or secondary)?  Other   Week 2 attempt successful?  Yes   Call start time  1701   Discharge diagnosis  B/l Nephrolithiasis, cysto, ureteroscopy, L stent insertion, R last lithotripsy   Call end time  1705   Is patient permission given to speak with other caregiver?  No   Meds reviewed with patient/caregiver?  Yes   Is the patient having any side effects they believe may be caused by any medication additions or changes?  No   Does the patient have all medications ordered at discharge?  Yes   Is the patient taking all medications as directed (includes completed medication regime)?  Yes   Medication comments  Patient given percocet RX today with procedure.    Does the patient have a primary care provider?   Yes   Has the patient kept scheduled appointments due by today?  Yes   Comments  Patient has urology appt Friday 6/26/20 210pm   Has home health visited the patient within 72 hours of discharge?  N/A   Pulse Ox monitoring  None   Psychosocial issues?  No   Did the patient receive a copy of their discharge instructions?  Yes   Nursing interventions  Reviewed instructions with patient   What is the patient's perception of their health status since discharge?  Same [Patient with lithotripsy procedure today. Patient reports that she has been doing ok since getting home this afternoon. Resting well. ]   Is the patient/caregiver able to teach back signs and symptoms related to disease process for when to call PCP?  Yes   Is the patient/caregiver able to teach back signs and symptoms related to disease process for when to call 911?  Yes   Is the patient/caregiver able to teach back the hierarchy of who to call/visit for symptoms/problems? PCP, Specialist, Home health nurse, Urgent Care, ED, 911  Yes   Week 2 Call Completed?   Yes          Dia Shepherd RN

## 2020-06-19 NOTE — ANESTHESIA PROCEDURE NOTES
Airway  Urgency: elective    Date/Time: 6/19/2020 11:40 AM  Airway not difficult    General Information and Staff    Patient location during procedure: OR  CRNA: Gunjan Villagomez CRNA    Indications and Patient Condition  Indications for airway management: airway protection    Preoxygenated: yes  MILS maintained throughout  Mask difficulty assessment: 0 - not attempted    Final Airway Details  Final airway type: supraglottic airway      Successful airway: unique  Size 3    Number of attempts at approach: 1  Assessment: lips, teeth, and gum same as pre-op

## 2020-06-19 NOTE — ANESTHESIA POSTPROCEDURE EVALUATION
Patient: Laxmi Higginbotham    Procedure Summary     Date:  06/19/20 Room / Location:  [facility] OR 06 /  [facility] OR    Anesthesia Start:  1135 Anesthesia Stop:  1214    Procedures:       EXTRACORPOREAL SHOCKWAVE LITHOTRIPSY (Left )      CYSTOSCOPY URETERAL STENT REMOVAL (Left ) Diagnosis:       Nephrolithiasis      (Nephrolithiasis [N20.0])    Surgeon:  Darrick Alexis MD Provider:  Kyle Vazquez MD    Anesthesia Type:  general ASA Status:  2          Anesthesia Type: general    Vitals  Vitals Value Taken Time   /93 6/19/2020 12:43 PM   Temp 97 °F (36.1 °C) 6/19/2020 12:15 PM   Pulse 68 6/19/2020 12:43 PM   Resp 16 6/19/2020 12:43 PM   SpO2 95 % 6/19/2020 12:43 PM           Post Anesthesia Care and Evaluation    Patient location during evaluation: bedside  Patient participation: complete - patient participated  Level of consciousness: awake and alert  Pain score: 1  Pain management: adequate  Airway patency: patent  Anesthetic complications: No anesthetic complications  PONV Status: none  Cardiovascular status: acceptable  Respiratory status: acceptable  Hydration status: acceptable

## 2020-06-25 ENCOUNTER — READMISSION MANAGEMENT (OUTPATIENT)
Dept: CALL CENTER | Facility: HOSPITAL | Age: 70
End: 2020-06-25

## 2020-06-25 NOTE — OUTREACH NOTE
Medical Week 3 Survey      Responses   [facility] patient discharged from?  Too   COVID-19 Test Status  Negative   Does the patient have one of the following disease processes/diagnoses(primary or secondary)?  Other   Week 3 attempt successful?  No   Unsuccessful attempts  Attempt 1          Edu Metz RN

## 2020-06-26 ENCOUNTER — HOSPITAL ENCOUNTER (OUTPATIENT)
Dept: GENERAL RADIOLOGY | Facility: HOSPITAL | Age: 70
Discharge: HOME | End: 2020-06-26
Admitting: UROLOGY

## 2020-06-26 ENCOUNTER — OFFICE VISIT (OUTPATIENT)
Dept: UROLOGY | Facility: CLINIC | Age: 70
End: 2020-06-26

## 2020-06-26 ENCOUNTER — READMISSION MANAGEMENT (OUTPATIENT)
Dept: CALL CENTER | Facility: HOSPITAL | Age: 70
End: 2020-06-26

## 2020-06-26 VITALS — TEMPERATURE: 98.6 F | HEIGHT: 64 IN | WEIGHT: 146 LBS | BODY MASS INDEX: 24.92 KG/M2

## 2020-06-26 DIAGNOSIS — N20.0 KIDNEY STONE: ICD-10-CM

## 2020-06-26 DIAGNOSIS — N20.0 KIDNEY STONE: Primary | ICD-10-CM

## 2020-06-26 DIAGNOSIS — N13.2 HYDRONEPHROSIS WITH URINARY OBSTRUCTION DUE TO RENAL CALCULUS: Primary | ICD-10-CM

## 2020-06-26 PROCEDURE — 99024 POSTOP FOLLOW-UP VISIT: CPT | Performed by: UROLOGY

## 2020-06-26 PROCEDURE — 74018 RADEX ABDOMEN 1 VIEW: CPT

## 2020-06-26 PROCEDURE — 74018 RADEX ABDOMEN 1 VIEW: CPT | Performed by: RADIOLOGY

## 2020-06-26 NOTE — PROGRESS NOTES
Chief Complaint:          Chief Complaint   Patient presents with   • Nephrolithiasis     post op        HPI:   69 y.o. female post initial right ureteroscopy and a subsequent left lithotripsy and stent removal she is doing great she feels great her KUB shows good fragmentation just a tiny bit of stone left I will see her back in 6 months I gave her reassurance she is done fantastic from my standpoint      Past Medical History:        Past Medical History:   Diagnosis Date   • Breast cancer (CMS/HCC)     right breast   • Diabetes mellitus (CMS/HCC)    • Elevated cholesterol    • GERD (gastroesophageal reflux disease)    • H/O seasonal allergies    • High cholesterol    • Hypertension    • Kidney stone    • Migraines    • Osteoarthritis    • Osteoporosis          Current Meds:     Current Outpatient Medications   Medication Sig Dispense Refill   • amitriptyline (ELAVIL) 10 MG tablet Take 10 mg by mouth Every Night.     • atorvastatin (LIPITOR) 40 MG tablet Take 40 mg by mouth Daily.     • cetirizine (zyrTEC) 10 MG tablet Take 10 mg by mouth Daily.     • denosumab (PROLIA) 60 MG/ML solution prefilled syringe syringe Inject 60 mg under the skin into the appropriate area as directed Every 6 (Six) Months.     • fluticasone (FLONASE) 50 MCG/ACT nasal spray 2 sprays into the nostril(s) as directed by provider Daily As Needed for Allergies.     • levoFLOXacin (LEVAQUIN) 250 MG tablet Take 250 mg by mouth Daily.     • montelukast (SINGULAIR) 10 MG tablet Take 10 mg by mouth Daily.     • oxyCODONE-acetaminophen (Percocet)  MG per tablet Take 1 tablet by mouth Every 4 (Four) Hours As Needed for Moderate Pain  (Pain). 16 tablet 0   • pantoprazole (PROTONIX) 40 MG EC tablet Take 40 mg by mouth Daily.     • polyethylene glycol (MIRALAX) packet Take 17 g by mouth Daily As Needed (Constipation).     • promethazine (PHENERGAN) 12.5 MG tablet Take 12.5 mg by mouth Every 6 (Six) Hours As Needed for Nausea or Vomiting.     •  rizatriptan (MAXALT) 10 MG tablet Take 10 mg by mouth 1 (One) Time As Needed for Migraine. May repeat in 2 hours if needed     • SITagliptin (JANUVIA) 25 MG tablet Take 1 tablet by mouth Daily. 30 tablet 0     No current facility-administered medications for this visit.         Allergies:      No Known Allergies     Past Surgical History:     Past Surgical History:   Procedure Laterality Date   • ABDOMINAL SURGERY     • BREAST LUMPECTOMY      RIGHT   • COLONOSCOPY     • CYSTOSCOPY W/ URETERAL STENT PLACEMENT Left 6/19/2020    Procedure: CYSTOSCOPY URETERAL STENT REMOVAL;  Surgeon: Darrick Alexis MD;  Location: [facility];  Service: Urology;  Laterality: Left;   • CYSTOSCOPY, RETROGRADE PYELOGRAM AND STENT INSERTION Bilateral 6/8/2020    Procedure: CYSTOSCOPY& BILATERAL  URETEROSCOPY WITH LEFT STENT INSERTION & LASER LITHOTRIPSY OF THE RIGHT;  Surgeon: Darrick Alexis MD;  Location: [facility];  Service: Urology;  Laterality: Bilateral;   • EXTRACORPOREAL SHOCK WAVE LITHOTRIPSY (ESWL) Right 5/17/2019    Procedure: EXTRACORPOREAL SHOCKWAVE LITHOTRIPSY;  Surgeon: Darrick Alexis MD;  Location: [facility];  Service: Urology   • EXTRACORPOREAL SHOCK WAVE LITHOTRIPSY (ESWL) Left 6/19/2020    Procedure: EXTRACORPOREAL SHOCKWAVE LITHOTRIPSY;  Surgeon: Darrick Alexis MD;  Location: [facility];  Service: Urology;  Laterality: Left;   • FEMUR SURGERY Right    • FRACTURE SURGERY Right    • GALLBLADDER SURGERY     • TUBAL ABDOMINAL LIGATION           Social History:     Social History     Socioeconomic History   • Marital status:      Spouse name: Not on file   • Number of children: Not on file   • Years of education: Not on file   • Highest education level: Not on file   Tobacco Use   • Smoking status: Former Smoker     Packs/day: 1.00     Years: 15.00     Pack years: 15.00     Types: Cigarettes   • Smokeless tobacco: Never Used   Substance and Sexual Activity   • Alcohol use: No   • Drug  use: No   • Sexual activity: Defer       Family History:     Family History   Problem Relation Age of Onset   • Heart disease Father    • Cancer Mother        Review of Systems:     Review of Systems   Constitutional: Negative.  Negative for activity change, appetite change, chills, diaphoresis, fatigue and unexpected weight change.   HENT: Negative for congestion, dental problem, drooling, ear discharge, ear pain, facial swelling, hearing loss, mouth sores, nosebleeds, postnasal drip, rhinorrhea, sinus pressure, sneezing, sore throat, tinnitus, trouble swallowing and voice change.    Eyes: Negative.  Negative for photophobia, pain, discharge, redness, itching and visual disturbance.   Respiratory: Negative.  Negative for apnea, cough, choking, chest tightness, shortness of breath, wheezing and stridor.    Cardiovascular: Negative.  Negative for chest pain, palpitations and leg swelling.   Gastrointestinal: Negative.  Negative for abdominal distention, abdominal pain, anal bleeding, blood in stool, constipation, diarrhea, nausea, rectal pain and vomiting.   Endocrine: Negative.  Negative for cold intolerance, heat intolerance, polydipsia, polyphagia and polyuria.   Musculoskeletal: Negative.  Negative for arthralgias, back pain, gait problem, joint swelling, myalgias, neck pain and neck stiffness.   Skin: Negative.  Negative for color change, pallor, rash and wound.   Allergic/Immunologic: Negative.  Negative for environmental allergies, food allergies and immunocompromised state.   Neurological: Negative.  Negative for dizziness, tremors, seizures, syncope, facial asymmetry, speech difficulty, weakness, light-headedness, numbness and headaches.   Hematological: Negative.  Negative for adenopathy. Does not bruise/bleed easily.   Psychiatric/Behavioral: Negative for agitation, behavioral problems, confusion, decreased concentration, dysphoric mood, hallucinations, self-injury, sleep disturbance and suicidal ideas.  The patient is not nervous/anxious and is not hyperactive.    All other systems reviewed and are negative.      Physical Exam:     Physical Exam   Constitutional: She appears well-developed and well-nourished.   HENT:   Head: Normocephalic and atraumatic.   Right Ear: External ear normal.   Left Ear: External ear normal.   Mouth/Throat: Oropharynx is clear and moist.   Eyes: Pupils are equal, round, and reactive to light. Conjunctivae are normal.   Cardiovascular: Normal rate, regular rhythm, normal heart sounds and intact distal pulses.   Pulmonary/Chest: Effort normal and breath sounds normal.   Abdominal: Soft. Bowel sounds are normal. She exhibits no distension and no mass. There is no tenderness. There is no rebound and no guarding.   Genitourinary: No vaginal discharge found.   Musculoskeletal: Normal range of motion.   Neurological: She is alert. She has normal reflexes.   Skin: Skin is warm and dry.   Psychiatric: She has a normal mood and affect. Her behavior is normal. Judgment and thought content normal.       I have reviewed the following portions of the patient's history: allergies, current medications, past family history, past medical history, past social history, past surgical history, problem list and ROS and confirm it's accurate.      Procedure:       Assessment/Plan:   Renal calculus-we discussed the presence of the stone we discussed the various therapeutic options available including percutaneous nephrostolithotomy, lithotripsy.  We discussed the risks of lithotripsy including the passage of stones the development of a large string of stones in the distal ureter known as Steinstrasse.  In the 3% incidence of that we will need to proceed with a ureteroscopy for obstructing fragments.  Extremely rare incidence of renal hematoma.  And the significance of this.  We discussed the absolute relative indicators for intervention including the presence of sepsis, and pain we cannot control is the primary  need for urgent intervention.  We discussed placement of a stent if indicated and the management of the stent as well.  Status post successful lithotripsy with a minimal stone burden remaining      Patient reports that she is not currently experiencing any symptoms of urinary incontinence.      Patient's Body mass index is 25.06 kg/m². BMI is above normal parameters. Recommendations include: educational material.              This document has been electronically signed by NADER MOORE MD June 26, 2020 14:06

## 2020-06-26 NOTE — OUTREACH NOTE
Medical Week 3 Survey      Responses   [facility] patient discharged from?  Too   COVID-19 Test Status  Negative   Does the patient have one of the following disease processes/diagnoses(primary or secondary)?  Other   Week 3 attempt successful?  Yes   Call start time  1833   Call end time  1835   Discharge diagnosis  B/l Nephrolithiasis, cysto, ureteroscopy, L stent insertion, R last lithotripsy   Meds reviewed with patient/caregiver?  Yes   Is the patient having any side effects they believe may be caused by any medication additions or changes?  No   Does the patient have all medications ordered at discharge?  Yes   Is the patient taking all medications as directed (includes completed medication regime)?  Yes   Does the patient have a primary care provider?   Yes   Does the patient have an appointment with their PCP within 7 days of discharge?  Yes   Has the patient kept scheduled appointments due by today?  Yes   Has home health visited the patient within 72 hours of discharge?  N/A   Pulse Ox monitoring  None   Psychosocial issues?  No   Did the patient receive a copy of their discharge instructions?  Yes   Nursing interventions  Reviewed instructions with patient   What is the patient's perception of their health status since discharge?  Improving   Is the patient/caregiver able to teach back signs and symptoms related to disease process for when to call PCP?  Yes   Is the patient/caregiver able to teach back signs and symptoms related to disease process for when to call 911?  Yes   Is the patient/caregiver able to teach back the hierarchy of who to call/visit for symptoms/problems? PCP, Specialist, Home health nurse, Urgent Care, ED, 911  Yes   Additional teach back comments  Right side is clean, left has some dust.  Drinking water, PCP says they are improving.   Week 3 Call Completed?  Yes   Wrap up additional comments  BP pills changed.            Marisol Bustillo RN

## 2020-07-03 ENCOUNTER — READMISSION MANAGEMENT (OUTPATIENT)
Dept: CALL CENTER | Facility: HOSPITAL | Age: 70
End: 2020-07-03

## 2023-04-25 ENCOUNTER — HOSPITAL ENCOUNTER (OUTPATIENT)
Facility: HOSPITAL | Age: 73
Discharge: HOME | End: 2023-04-27
Attending: INTERNAL MEDICINE | Admitting: INTERNAL MEDICINE
Payer: MEDICARE

## 2023-04-25 LAB
ALBUMIN SERPL-MCNC: 4.1 G/DL (ref 3.5–5.2)
ALBUMIN/GLOB SERPL: 1.1 G/DL
ALP SERPL-CCNC: 123 U/L (ref 39–117)
ALT SERPL W P-5'-P-CCNC: 45 U/L (ref 1–33)
ANION GAP SERPL CALCULATED.3IONS-SCNC: 13.9 MMOL/L (ref 5–15)
AST SERPL-CCNC: 80 U/L (ref 1–32)
BACTERIA UR QL AUTO: ABNORMAL /HPF
BASOPHILS # BLD AUTO: 0.07 10*3/MM3 (ref 0–0.2)
BASOPHILS NFR BLD AUTO: 0.8 % (ref 0–1.5)
BILIRUB SERPL-MCNC: 0.8 MG/DL (ref 0–1.2)
BILIRUB UR QL STRIP: NEGATIVE
BUN SERPL-MCNC: 15 MG/DL (ref 8–23)
BUN/CREAT SERPL: 11.8 (ref 7–25)
CALCIUM SPEC-SCNC: 9.4 MG/DL (ref 8.6–10.5)
CHLORIDE SERPL-SCNC: 104 MMOL/L (ref 98–107)
CHOLEST SERPL-MCNC: 192 MG/DL (ref 0–200)
CLARITY UR: CLEAR
CO2 SERPL-SCNC: 22.1 MMOL/L (ref 22–29)
COLOR UR: YELLOW
CREAT SERPL-MCNC: 1.27 MG/DL (ref 0.57–1)
DEPRECATED RDW RBC AUTO: 45 FL (ref 37–54)
EGFRCR SERPLBLD CKD-EPI 2021: 44.5 ML/MIN/1.73
EOSINOPHIL # BLD AUTO: 0.02 10*3/MM3 (ref 0–0.4)
EOSINOPHIL NFR BLD AUTO: 0.2 % (ref 0.3–6.2)
ERYTHROCYTE [DISTWIDTH] IN BLOOD BY AUTOMATED COUNT: 13.7 % (ref 12.3–15.4)
FLUAV RNA RESP QL NAA+PROBE: NOT DETECTED
FLUBV RNA RESP QL NAA+PROBE: NOT DETECTED
GLOBULIN UR ELPH-MCNC: 3.6 GM/DL
GLUCOSE BLDC GLUCOMTR-MCNC: 197 MG/DL (ref 70–130)
GLUCOSE BLDC GLUCOMTR-MCNC: 197 MG/DL (ref 70–130)
GLUCOSE BLDC GLUCOMTR-MCNC: 215 MG/DL (ref 70–130)
GLUCOSE SERPL-MCNC: 228 MG/DL (ref 65–99)
GLUCOSE UR STRIP-MCNC: ABNORMAL MG/DL
HBA1C MFR BLD: 7.5 % (ref 4.8–5.6)
HCT VFR BLD AUTO: 43.3 % (ref 34–46.6)
HDLC SERPL-MCNC: 54 MG/DL (ref 40–60)
HGB BLD-MCNC: 13.6 G/DL (ref 12–15.9)
HGB UR QL STRIP.AUTO: ABNORMAL
HYALINE CASTS UR QL AUTO: ABNORMAL /LPF
IMM GRANULOCYTES # BLD AUTO: 0.04 10*3/MM3 (ref 0–0.05)
IMM GRANULOCYTES NFR BLD AUTO: 0.4 % (ref 0–0.5)
KETONES UR QL STRIP: NEGATIVE
LDLC SERPL CALC-MCNC: 109 MG/DL (ref 0–100)
LDLC/HDLC SERPL: 1.93 {RATIO}
LEUKOCYTE ESTERASE UR QL STRIP.AUTO: ABNORMAL
LYMPHOCYTES # BLD AUTO: 1.5 10*3/MM3 (ref 0.7–3.1)
LYMPHOCYTES NFR BLD AUTO: 16.4 % (ref 19.6–45.3)
MAGNESIUM SERPL-MCNC: 1.7 MG/DL (ref 1.6–2.4)
MCH RBC QN AUTO: 28.3 PG (ref 26.6–33)
MCHC RBC AUTO-ENTMCNC: 31.4 G/DL (ref 31.5–35.7)
MCV RBC AUTO: 90 FL (ref 79–97)
MONOCYTES # BLD AUTO: 0.9 10*3/MM3 (ref 0.1–0.9)
MONOCYTES NFR BLD AUTO: 9.8 % (ref 5–12)
NEUTROPHILS NFR BLD AUTO: 6.62 10*3/MM3 (ref 1.7–7)
NEUTROPHILS NFR BLD AUTO: 72.4 % (ref 42.7–76)
NITRITE UR QL STRIP: NEGATIVE
NRBC BLD AUTO-RTO: 0 /100 WBC (ref 0–0.2)
PH UR STRIP.AUTO: 5.5 [PH] (ref 5–8)
PLATELET # BLD AUTO: 186 10*3/MM3 (ref 140–450)
PMV BLD AUTO: 9.7 FL (ref 6–12)
POTASSIUM SERPL-SCNC: 4.8 MMOL/L (ref 3.5–5.2)
PROT SERPL-MCNC: 7.7 G/DL (ref 6–8.5)
PROT UR QL STRIP: NEGATIVE
RBC # BLD AUTO: 4.81 10*6/MM3 (ref 3.77–5.28)
RBC # UR STRIP: ABNORMAL /HPF
REF LAB TEST METHOD: ABNORMAL
SARS-COV-2 RNA RESP QL NAA+PROBE: NOT DETECTED
SODIUM SERPL-SCNC: 140 MMOL/L (ref 136–145)
SP GR UR STRIP: 1.02 (ref 1–1.03)
SQUAMOUS #/AREA URNS HPF: ABNORMAL /HPF
TRIGL SERPL-MCNC: 168 MG/DL (ref 0–150)
UROBILINOGEN UR QL STRIP: ABNORMAL
VLDLC SERPL-MCNC: 29 MG/DL (ref 5–40)
WBC # UR STRIP: ABNORMAL /HPF
WBC NRBC COR # BLD: 9.15 10*3/MM3 (ref 3.4–10.8)

## 2023-04-25 PROCEDURE — 63710000001 ACETAMINOPHEN 325 MG TABLET

## 2023-04-25 PROCEDURE — 82962 GLUCOSE BLOOD TEST: CPT

## 2023-04-25 PROCEDURE — 96361 HYDRATE IV INFUSION ADD-ON: CPT

## 2023-04-25 PROCEDURE — 87636 SARSCOV2 & INF A&B AMP PRB: CPT

## 2023-04-25 PROCEDURE — 96374 THER/PROPH/DIAG INJ IV PUSH: CPT

## 2023-04-25 PROCEDURE — 87086 URINE CULTURE/COLONY COUNT: CPT

## 2023-04-25 PROCEDURE — A9270 NON-COVERED ITEM OR SERVICE: HCPCS | Performed by: INTERNAL MEDICINE

## 2023-04-25 PROCEDURE — 80053 COMPREHEN METABOLIC PANEL: CPT

## 2023-04-25 PROCEDURE — 87040 BLOOD CULTURE FOR BACTERIA: CPT | Performed by: INTERNAL MEDICINE

## 2023-04-25 PROCEDURE — 99222 1ST HOSP IP/OBS MODERATE 55: CPT

## 2023-04-25 PROCEDURE — 85025 COMPLETE CBC W/AUTO DIFF WBC: CPT

## 2023-04-25 PROCEDURE — A9270 NON-COVERED ITEM OR SERVICE: HCPCS

## 2023-04-25 PROCEDURE — 80061 LIPID PANEL: CPT

## 2023-04-25 PROCEDURE — 63710000001 FAMOTIDINE 20 MG TABLET: Performed by: INTERNAL MEDICINE

## 2023-04-25 PROCEDURE — 63710000001 INSULIN LISPRO (HUMAN) PER 5 UNITS

## 2023-04-25 PROCEDURE — 63710000001 MELATONIN 5 MG TABLET

## 2023-04-25 PROCEDURE — 81001 URINALYSIS AUTO W/SCOPE: CPT

## 2023-04-25 PROCEDURE — 93010 ELECTROCARDIOGRAM REPORT: CPT | Performed by: INTERNAL MEDICINE

## 2023-04-25 PROCEDURE — 25010000002 HYDROMORPHONE PER 4 MG: Performed by: INTERNAL MEDICINE

## 2023-04-25 PROCEDURE — C9803 HOPD COVID-19 SPEC COLLECT: HCPCS

## 2023-04-25 PROCEDURE — 63710000001 AMITRIPTYLINE 10 MG TABLET: Performed by: INTERNAL MEDICINE

## 2023-04-25 PROCEDURE — 63710000001 ATORVASTATIN 40 MG TABLET: Performed by: INTERNAL MEDICINE

## 2023-04-25 PROCEDURE — 83036 HEMOGLOBIN GLYCOSYLATED A1C: CPT

## 2023-04-25 PROCEDURE — 93005 ELECTROCARDIOGRAM TRACING: CPT

## 2023-04-25 PROCEDURE — 83735 ASSAY OF MAGNESIUM: CPT

## 2023-04-25 RX ORDER — POLYETHYLENE GLYCOL 3350 17 G/17G
17 POWDER, FOR SOLUTION ORAL DAILY
Status: DISCONTINUED | OUTPATIENT
Start: 2023-04-26 | End: 2023-04-27 | Stop reason: HOSPADM

## 2023-04-25 RX ORDER — LISINOPRIL 2.5 MG/1
2.5 TABLET ORAL DAILY
COMMUNITY

## 2023-04-25 RX ORDER — GLIPIZIDE 5 MG/1
5 TABLET ORAL DAILY
Status: CANCELLED | OUTPATIENT
Start: 2023-04-25

## 2023-04-25 RX ORDER — MONTELUKAST SODIUM 10 MG/1
10 TABLET ORAL DAILY
Status: DISCONTINUED | OUTPATIENT
Start: 2023-04-26 | End: 2023-04-27 | Stop reason: HOSPADM

## 2023-04-25 RX ORDER — ONDANSETRON 2 MG/ML
4 INJECTION INTRAMUSCULAR; INTRAVENOUS EVERY 6 HOURS PRN
Status: DISCONTINUED | OUTPATIENT
Start: 2023-04-25 | End: 2023-04-27 | Stop reason: HOSPADM

## 2023-04-25 RX ORDER — GLUCAGON 1 MG/ML
1 KIT INJECTION
Status: DISCONTINUED | OUTPATIENT
Start: 2023-04-25 | End: 2023-04-27 | Stop reason: HOSPADM

## 2023-04-25 RX ORDER — PANTOPRAZOLE SODIUM 40 MG/1
40 TABLET, DELAYED RELEASE ORAL
Status: DISCONTINUED | OUTPATIENT
Start: 2023-04-26 | End: 2023-04-27 | Stop reason: HOSPADM

## 2023-04-25 RX ORDER — FAMOTIDINE 20 MG/1
20 TABLET, FILM COATED ORAL 2 TIMES DAILY
COMMUNITY

## 2023-04-25 RX ORDER — SODIUM CHLORIDE 9 MG/ML
100 INJECTION, SOLUTION INTRAVENOUS CONTINUOUS
Status: DISCONTINUED | OUTPATIENT
Start: 2023-04-25 | End: 2023-04-27 | Stop reason: HOSPADM

## 2023-04-25 RX ORDER — CETIRIZINE HYDROCHLORIDE 10 MG/1
10 TABLET ORAL DAILY PRN
Status: DISCONTINUED | OUTPATIENT
Start: 2023-04-25 | End: 2023-04-27 | Stop reason: HOSPADM

## 2023-04-25 RX ORDER — AMITRIPTYLINE HYDROCHLORIDE 10 MG/1
10 TABLET, FILM COATED ORAL 2 TIMES DAILY
Status: DISCONTINUED | OUTPATIENT
Start: 2023-04-25 | End: 2023-04-27 | Stop reason: HOSPADM

## 2023-04-25 RX ORDER — HYDROMORPHONE HYDROCHLORIDE 1 MG/ML
0.5 INJECTION, SOLUTION INTRAMUSCULAR; INTRAVENOUS; SUBCUTANEOUS EVERY 4 HOURS PRN
Status: DISCONTINUED | OUTPATIENT
Start: 2023-04-25 | End: 2023-04-27 | Stop reason: HOSPADM

## 2023-04-25 RX ORDER — SODIUM CHLORIDE 9 MG/ML
40 INJECTION, SOLUTION INTRAVENOUS AS NEEDED
Status: DISCONTINUED | OUTPATIENT
Start: 2023-04-25 | End: 2023-04-27 | Stop reason: HOSPADM

## 2023-04-25 RX ORDER — FAMOTIDINE 20 MG/1
20 TABLET, FILM COATED ORAL 2 TIMES DAILY
Status: DISCONTINUED | OUTPATIENT
Start: 2023-04-25 | End: 2023-04-27 | Stop reason: HOSPADM

## 2023-04-25 RX ORDER — SODIUM CHLORIDE 0.9 % (FLUSH) 0.9 %
10 SYRINGE (ML) INJECTION EVERY 12 HOURS SCHEDULED
Status: DISCONTINUED | OUTPATIENT
Start: 2023-04-25 | End: 2023-04-27 | Stop reason: HOSPADM

## 2023-04-25 RX ORDER — DEXTROSE MONOHYDRATE 25 G/50ML
25 INJECTION, SOLUTION INTRAVENOUS
Status: DISCONTINUED | OUTPATIENT
Start: 2023-04-25 | End: 2023-04-27 | Stop reason: HOSPADM

## 2023-04-25 RX ORDER — INSULIN LISPRO 100 [IU]/ML
2-7 INJECTION, SOLUTION INTRAVENOUS; SUBCUTANEOUS
Status: DISCONTINUED | OUTPATIENT
Start: 2023-04-25 | End: 2023-04-27 | Stop reason: HOSPADM

## 2023-04-25 RX ORDER — ACETAMINOPHEN 325 MG/1
650 TABLET ORAL EVERY 6 HOURS PRN
Status: DISCONTINUED | OUTPATIENT
Start: 2023-04-25 | End: 2023-04-27 | Stop reason: HOSPADM

## 2023-04-25 RX ORDER — PANTOPRAZOLE SODIUM 40 MG/1
40 TABLET, DELAYED RELEASE ORAL DAILY
Status: CANCELLED | OUTPATIENT
Start: 2023-04-25

## 2023-04-25 RX ORDER — GLIPIZIDE 5 MG/1
5 TABLET ORAL DAILY
COMMUNITY

## 2023-04-25 RX ORDER — NITROGLYCERIN 0.4 MG/1
0.4 TABLET SUBLINGUAL
Status: DISCONTINUED | OUTPATIENT
Start: 2023-04-25 | End: 2023-04-27 | Stop reason: HOSPADM

## 2023-04-25 RX ORDER — SODIUM CHLORIDE 0.9 % (FLUSH) 0.9 %
10 SYRINGE (ML) INJECTION AS NEEDED
Status: DISCONTINUED | OUTPATIENT
Start: 2023-04-25 | End: 2023-04-27 | Stop reason: HOSPADM

## 2023-04-25 RX ORDER — CHOLECALCIFEROL (VITAMIN D3) 125 MCG
5 CAPSULE ORAL NIGHTLY PRN
Status: DISCONTINUED | OUTPATIENT
Start: 2023-04-25 | End: 2023-04-27 | Stop reason: HOSPADM

## 2023-04-25 RX ORDER — ATORVASTATIN CALCIUM 40 MG/1
40 TABLET, FILM COATED ORAL NIGHTLY
Status: DISCONTINUED | OUTPATIENT
Start: 2023-04-25 | End: 2023-04-27 | Stop reason: HOSPADM

## 2023-04-25 RX ORDER — NICOTINE POLACRILEX 4 MG
15 LOZENGE BUCCAL
Status: DISCONTINUED | OUTPATIENT
Start: 2023-04-25 | End: 2023-04-27 | Stop reason: HOSPADM

## 2023-04-25 RX ORDER — LISINOPRIL 2.5 MG/1
2.5 TABLET ORAL DAILY
Status: CANCELLED | OUTPATIENT
Start: 2023-04-25

## 2023-04-25 RX ORDER — POLYETHYLENE GLYCOL 3350 17 G/17G
17 POWDER, FOR SOLUTION ORAL DAILY
Status: DISCONTINUED | OUTPATIENT
Start: 2023-04-25 | End: 2023-04-25

## 2023-04-25 RX ADMIN — FAMOTIDINE 20 MG: 20 TABLET, FILM COATED ORAL at 20:58

## 2023-04-25 RX ADMIN — AMITRIPTYLINE HYDROCHLORIDE 10 MG: 10 TABLET, FILM COATED ORAL at 20:58

## 2023-04-25 RX ADMIN — ACETAMINOPHEN 650 MG: 325 TABLET ORAL at 20:58

## 2023-04-25 RX ADMIN — HYDROMORPHONE HYDROCHLORIDE 0.5 MG: 1 INJECTION, SOLUTION INTRAMUSCULAR; INTRAVENOUS; SUBCUTANEOUS at 17:43

## 2023-04-25 RX ADMIN — SODIUM CHLORIDE 100 ML/HR: 9 INJECTION, SOLUTION INTRAVENOUS at 16:56

## 2023-04-25 RX ADMIN — INSULIN LISPRO 2 UNITS: 100 INJECTION, SOLUTION INTRAVENOUS; SUBCUTANEOUS at 17:42

## 2023-04-25 RX ADMIN — Medication 10 ML: at 20:59

## 2023-04-25 RX ADMIN — Medication 5 MG: at 20:58

## 2023-04-25 RX ADMIN — ATORVASTATIN CALCIUM 40 MG: 40 TABLET, FILM COATED ORAL at 20:58

## 2023-04-25 NOTE — H&P
[facility]  History & Physical    Patient Identification:  Name:  Laxmi Higginbotham  Age:  72 y.o.  Sex:  female  :  1950  MRN:  [id number]   Visit Number:  [id number]  Admit Date: 2023   Primary Care Physician:  Amanda Pozo MD    Subjective     Chief complaint: Right Flank Pain    History of presenting illness:      Laxmi Higginbotham is a 72 y.o. female with past medical history significant for non-insulin dependent type II DM, former tobacco abuse, history of breast cancer s/p lumpectomy, GERD, hyperlipidemia, essential hypertension, osteoarthritis, osteoporosis, and multiple instances of nephrolithiasis in the past s/p lithotripsy. Patient presents to [facility] today as a transfer from [facility] due to obstructing stone and JAN. Urology (Dr. Alexis) has been contacted and is agreeable to consult on patient. On exam, patient states that right-sided flank pain with radiation to the right lower abdominal quadrant awoke her from sleep this morning. She states that pain made her nauseous and she felt like she needed to vomit. She presented to [facility] at that time. Due to need for urology, she was transferred to our facility. Patient states that she follows with Dr. Alexis in the outpatient setting and has had multiple lithotripsy procedures in the past. Per report, she currently has a significantly large obstructing stone and JAN. Patient denies any any hematuria, dysuria, or difficulty urinating. She also denies any chest pain, palpitations, or dizziness. States that she occasionally becomes short of breath with activity. Improves with rest. Lung sounds are CTA bilaterally. No wheezing, rhonchi, or rales. Breathing is even and unlabored. Cardiac exam revealed regular rhythm with increased rate. She is sinus tachycardia 100s per bedside monitor. EKG ordered for admission/pre-op. Patient appears euvolemic. No peripheral  edema. She says that occasionally she has mild dependent edema that improves with elevation of lower extremities. She denies ever having cardiac procedures, stents, etc. States that she has hypertension and high cholesterol. She is also a type 2 diabetic. On oral antidiabetic agents. States that she is compliant with her home medications. She denies tobacco abuse or alcohol abuse. Admits to former cigarette smoking. States that she quit in 2017. She denies any other acute complaints, needs, or concerns at this time. RN Helga at bedside during exam.     Upon arrival to the floor, vital signs were temperature 98.3, pulse 115, respirations 20, blood pressure 157/88 sitting, SPO2 saturation 96% on room air.  CBC unremarkable.  CMP with glucose 228, creatinine 1.27, EGFR 44.5, alkaline phosphatase 123, ALT 45, AST 80, otherwise unremarkable.  Urinalysis with 3+ glucose, trace blood, trace leukocytes, 3-5 RBCs, and 6-12 WBCs.  Urine culture pending.  COVID-19 and flu A/B swab negative.    [facility] Discussed with admitting physician, Aurelio Venegas MD.     ---------------------------------------------------------------------------------------------------------------------   Review of Systems   Constitutional: Negative for chills, fatigue and fever.   HENT: Negative for congestion, sore throat and trouble swallowing.    Respiratory: Positive for shortness of breath (occasionally w/ activity). Negative for cough and wheezing.    Cardiovascular: Negative for chest pain, palpitations and leg swelling.   Gastrointestinal: Positive for abdominal pain (RLQ). Negative for constipation, diarrhea, nausea and vomiting.   Genitourinary: Positive for flank pain (right-side). Negative for difficulty urinating, dysuria, hematuria and urgency.   Musculoskeletal: Negative for gait problem, neck pain and neck stiffness.   Neurological: Negative for dizziness, tremors, seizures, syncope,  facial asymmetry, speech difficulty, weakness, light-headedness, numbness and headaches.      ---------------------------------------------------------------------------------------------------------------------   Past Medical History:   Diagnosis Date   • Breast cancer     right breast   • Diabetes mellitus    • Elevated cholesterol    • GERD (gastroesophageal reflux disease)    • H/O seasonal allergies    • High cholesterol    • Hypertension    • Kidney stone    • Migraines    • Osteoarthritis    • Osteoporosis      Past Surgical History:   Procedure Laterality Date   • ABDOMINAL SURGERY     • BREAST LUMPECTOMY      RIGHT   • COLONOSCOPY     • CYSTOSCOPY W/ URETERAL STENT PLACEMENT Left 6/19/2020    Procedure: CYSTOSCOPY URETERAL STENT REMOVAL;  Surgeon: Darrick Alexis MD;  Location: [facility];  Service: Urology;  Laterality: Left;   • CYSTOSCOPY, RETROGRADE PYELOGRAM AND STENT INSERTION Bilateral 6/8/2020    Procedure: CYSTOSCOPY& BILATERAL  URETEROSCOPY WITH LEFT STENT INSERTION & LASER LITHOTRIPSY OF THE RIGHT;  Surgeon: Darrick Alexis MD;  Location: [facility];  Service: Urology;  Laterality: Bilateral;   • EXTRACORPOREAL SHOCK WAVE LITHOTRIPSY (ESWL) Right 5/17/2019    Procedure: EXTRACORPOREAL SHOCKWAVE LITHOTRIPSY;  Surgeon: Darrick Alexis MD;  Location: [facility];  Service: Urology   • EXTRACORPOREAL SHOCK WAVE LITHOTRIPSY (ESWL) Left 6/19/2020    Procedure: EXTRACORPOREAL SHOCKWAVE LITHOTRIPSY;  Surgeon: Darrick Alexis MD;  Location: [facility];  Service: Urology;  Laterality: Left;   • FEMUR SURGERY Right    • FRACTURE SURGERY Right    • GALLBLADDER SURGERY     • TUBAL ABDOMINAL LIGATION       Family History   Problem Relation Age of Onset   • Heart disease Father    • Cancer Mother      Social History     Socioeconomic History   • Marital status:    Tobacco Use   • Smoking status: Former     Packs/day: 1.00     Years: 15.00     Pack years: 15.00     Types:  Cigarettes   • Smokeless tobacco: Never   Vaping Use   • Vaping Use: Unknown   Substance and Sexual Activity   • Alcohol use: No   • Drug use: No   • Sexual activity: Defer     ---------------------------------------------------------------------------------------------------------------------   Allergies:  Patient has no known allergies.  ---------------------------------------------------------------------------------------------------------------------   Home medications:    Medications below are reported home medications pulling from within the system; at this time, these medications have not been reconciled unless otherwise specified and are in the verification process for further verifcation as current home medications.  Medications Prior to Admission   Medication Sig Dispense Refill Last Dose   • amitriptyline (ELAVIL) 10 MG tablet Take 10 mg by mouth Every Night.      • atorvastatin (LIPITOR) 40 MG tablet Take 40 mg by mouth Daily.      • cetirizine (zyrTEC) 10 MG tablet Take 10 mg by mouth Daily.      • denosumab (PROLIA) 60 MG/ML solution prefilled syringe syringe Inject 60 mg under the skin into the appropriate area as directed Every 6 (Six) Months.      • fluticasone (FLONASE) 50 MCG/ACT nasal spray 2 sprays into the nostril(s) as directed by provider Daily As Needed for Allergies.      • levoFLOXacin (LEVAQUIN) 250 MG tablet Take 250 mg by mouth Daily.      • montelukast (SINGULAIR) 10 MG tablet Take 10 mg by mouth Daily.      • oxyCODONE-acetaminophen (Percocet)  MG per tablet Take 1 tablet by mouth Every 4 (Four) Hours As Needed for Moderate Pain  (Pain). 16 tablet 0    • pantoprazole (PROTONIX) 40 MG EC tablet Take 40 mg by mouth Daily.      • polyethylene glycol (MIRALAX) packet Take 17 g by mouth Daily As Needed (Constipation).      • promethazine (PHENERGAN) 12.5 MG tablet Take 12.5 mg by mouth Every 6 (Six) Hours As Needed for Nausea or Vomiting.      • rizatriptan (MAXALT) 10 MG tablet  Take 10 mg by mouth 1 (One) Time As Needed for Migraine. May repeat in 2 hours if needed      • SITagliptin (JANUVIA) 25 MG tablet Take 1 tablet by mouth Daily. 30 tablet 0        Hospital Scheduled Meds:         Current listed hospital scheduled medications may not yet reflect those currently placed in orders that are signed and held awaiting patient's arrival to floor.   ---------------------------------------------------------------------------------------------------------------------     Objective     Vital Signs:  Temp:  [98.3 °F (36.8 °C)] 98.3 °F (36.8 °C)  Heart Rate:  [115] 115  Resp:  [20] 20  BP: (157)/(88) 157/88  There were no vitals filed for this visit.  There is no height or weight on file to calculate BMI.  ---------------------------------------------------------------------------------------------------------------------       Physical Exam  Vitals reviewed.   Constitutional:       General: She is awake. She is not in acute distress.     Appearance: She is not ill-appearing or diaphoretic.   HENT:      Head: Normocephalic and atraumatic.      Mouth/Throat:      Mouth: Mucous membranes are moist.      Pharynx: Oropharynx is clear.   Eyes:      Extraocular Movements: Extraocular movements intact.      Pupils: Pupils are equal, round, and reactive to light.   Cardiovascular:      Rate and Rhythm: Regular rhythm. Tachycardia present.      Pulses: Normal pulses.           Dorsalis pedis pulses are 2+ on the right side and 2+ on the left side.      Heart sounds: Normal heart sounds. No murmur heard.    No friction rub.   Pulmonary:      Effort: Pulmonary effort is normal. No accessory muscle usage, respiratory distress or retractions.      Breath sounds: No wheezing, rhonchi or rales.   Abdominal:      General: Bowel sounds are decreased. There is no distension.      Palpations: Abdomen is soft.      Tenderness: There is no abdominal tenderness. There is no guarding.   Musculoskeletal:      Cervical  back: Neck supple. No rigidity.      Right lower leg: No edema.      Left lower leg: No edema.   Skin:     General: Skin is warm and dry.      Capillary Refill: Capillary refill takes 2 to 3 seconds.   Neurological:      Mental Status: She is alert and oriented to person, place, and time. Mental status is at baseline.      Cranial Nerves: No dysarthria or facial asymmetry.      Sensory: Sensation is intact.      Motor: No weakness or tremor.   Psychiatric:         Attention and Perception: Attention normal.         Mood and Affect: Mood normal.         Speech: Speech normal.         Behavior: Behavior normal. Behavior is cooperative.         Thought Content: Thought content normal.         Cognition and Memory: Cognition normal.         Judgment: Judgment normal.       ---------------------------------------------------------------------------------------------------------------------  EKG:  Will obtain EKG for baseline/admission/pre-op.     Telemetry:  Appearing sinus tachycardia 100s on my exam.   ---------------------------------------------------------------------------------------------------------------------   Results from last 7 days   Lab Units 04/25/23  1558   WBC 10*3/mm3 9.15   HEMOGLOBIN g/dL 13.6   HEMATOCRIT % 43.3   MCV fL 90.0   MCHC g/dL 31.4*   PLATELETS 10*3/mm3 186               Invalid input(s): PROTCrCl cannot be calculated (Patient's most recent lab result is older than the maximum 30 days allowed.).  No results found for: AMMONIA          Lab Results   Component Value Date    HGBA1C 7.00 (H) 06/08/2020     Lab Results   Component Value Date    TSH 2.340 06/08/2020     No results found for: PREGTESTUR, PREGSERUM, HCG, HCGQUANT  Pain Management Panel          View : No data to display.                        ---------------------------------------------------------------------------------------------------------------------  Imaging Results (Last 7 Days)     ** No results found for the last  168 hours. **          Last echocardiogram: No previous echo on file.      I have personally reviewed the above radiology images and read the final radiology report on 04/25/23  ---------------------------------------------------------------------------------------------------------------------  Assessment / Plan     Active Hospital Problems    Diagnosis  POA   • **Nephrolithiasis [N20.0]  Yes       ASSESSMENT/PLAN:  -JAN on CKD of unknown stage due to obstructing renal stone, POA  -Right-sided flank pain, POA  -Cr 1.2 on admission. EGFR 44.5.  -Recent baseline creatinine unknown. Most recent values available are from 2020. Cr 2.3-2.5 at that time.   -NS infusion initiated @ 100 ml/hr.   -Patient follows with Nephrology, Dr. Barrios in the outpatient setting. States that she had office visit last week which went well. She said that her eGFR had increased from 30s to 40s.   -Imaging obtained at [facility] reportedly with large obstructing renal stone. Patient has had multiple instances of nephrolithiasis in the past requiring lithotripsy.   -Dr. Alexis contacted and agreeable to consult on patient. Planning for scope in the a.m.  -Hold NPO after midnight tonight.   -Patient denies dysuria symptoms at this time. During my exam, denied any right flank pain. UA revealed 3+ glucose, trace blood, trace leuks, 3-5 RBCs, and 6-12 WBCs. No bacteria. Nitrites negative. No squamous epithelial cells.   -PRN Dilaudid available. Hold for respiratory depression or oversedation.   -PRN Zofran available for N/V. EKG ordered for evaluation of QTc, pending.   -Continuous cardiac monitoring and pulse oximetry.   -Avoid nephrotoxic agents as able.   -Repeat chemistry panel in the a.m.     -Essential hypertension  -BP appears mild-moderately elevated with systolic 150s on arrival.   -Plan to resume home antihypertensive regimen once reconciled per pharmacy with appropriate holding parameters to prevent hypotension and/or bradycardia.    -Closely monitor BP per hospital protocol, titrate medications as necessary.    -Type II diabetes mellitus, non insulin dependent  -Obtain HgbA1C.  -Review home medication regimen once reconciled per pharmacy.   -Hold home oral DM medications for now.  -Start low dose SSI, titrate dosage as necessary.  -Closely monitor blood glucose levels with accuchecks AC and HS.  -Hypoglycemia protocol in place.    -Hyperlipidemia; obtain fasting lipid panel in the a.m. Resume home statin once medications reconciled.  -Osteoarthritis; supportive care.  -Osteoporosis; review home medications once reconciled.  -GERD; reflux precautions. PPI.   -Former tobacco abuse; encourage continued cessation.   -History of migraines  -Chronic appearing mild transaminitis and elevated alkaline phosphatase     ----------  -DVT prophylaxis: Bilateral SCDs.   -Activity: Up with assistance as tolerated.   -Expected length of stay:   INPATIENT status due to the need for care which can only be reasonably provided in an hospital setting such as aggressive/expedited ancillary services and/or consultation services, the necessity for IV medications, close physician monitoring and/or the possible need for procedures.  In such, I feel patient's risk for adverse outcomes and need for care warrant INPATIENT evaluation and predict the patient's care encounter to likely last beyond 2 midnights.  -Disposition plans to return home on discharge once medically stable.    High risk secondary to JAN 2/2 obstructing renal stone.       CODE STATUS: FULL CODE, discussed with patient at bedside.      AGUEDA Rouse   04/25/23  16:30 EDT  Pager #[phone]  ---------------------------------------------------------------------------------------------------------------------

## 2023-04-26 ENCOUNTER — APPOINTMENT (OUTPATIENT)
Dept: CT IMAGING | Facility: HOSPITAL | Age: 73
End: 2023-04-26
Payer: MEDICARE

## 2023-04-26 LAB
ALBUMIN SERPL-MCNC: 3.6 G/DL (ref 3.5–5.2)
ALBUMIN/GLOB SERPL: 1.3 G/DL
ALP SERPL-CCNC: 100 U/L (ref 39–117)
ALT SERPL W P-5'-P-CCNC: 40 U/L (ref 1–33)
ANION GAP SERPL CALCULATED.3IONS-SCNC: 11.4 MMOL/L (ref 5–15)
AST SERPL-CCNC: 49 U/L (ref 1–32)
BACTERIA SPEC AEROBE CULT: NORMAL
BILIRUB SERPL-MCNC: 0.5 MG/DL (ref 0–1.2)
BUN SERPL-MCNC: 20 MG/DL (ref 8–23)
BUN/CREAT SERPL: 15.6 (ref 7–25)
CALCIUM SPEC-SCNC: 8.4 MG/DL (ref 8.6–10.5)
CHLORIDE SERPL-SCNC: 105 MMOL/L (ref 98–107)
CO2 SERPL-SCNC: 23.6 MMOL/L (ref 22–29)
CREAT SERPL-MCNC: 1.28 MG/DL (ref 0.57–1)
DEPRECATED RDW RBC AUTO: 48.2 FL (ref 37–54)
EGFRCR SERPLBLD CKD-EPI 2021: 44.1 ML/MIN/1.73
ERYTHROCYTE [DISTWIDTH] IN BLOOD BY AUTOMATED COUNT: 13.9 % (ref 12.3–15.4)
GLOBULIN UR ELPH-MCNC: 2.8 GM/DL
GLUCOSE BLDC GLUCOMTR-MCNC: 146 MG/DL (ref 70–130)
GLUCOSE BLDC GLUCOMTR-MCNC: 168 MG/DL (ref 70–130)
GLUCOSE BLDC GLUCOMTR-MCNC: 178 MG/DL (ref 70–130)
GLUCOSE BLDC GLUCOMTR-MCNC: 184 MG/DL (ref 70–130)
GLUCOSE SERPL-MCNC: 241 MG/DL (ref 65–99)
HCT VFR BLD AUTO: 37.7 % (ref 34–46.6)
HGB BLD-MCNC: 11.6 G/DL (ref 12–15.9)
MCH RBC QN AUTO: 28.7 PG (ref 26.6–33)
MCHC RBC AUTO-ENTMCNC: 30.8 G/DL (ref 31.5–35.7)
MCV RBC AUTO: 93.3 FL (ref 79–97)
PLATELET # BLD AUTO: 157 10*3/MM3 (ref 140–450)
PMV BLD AUTO: 10.2 FL (ref 6–12)
POTASSIUM SERPL-SCNC: 4.7 MMOL/L (ref 3.5–5.2)
PROT SERPL-MCNC: 6.4 G/DL (ref 6–8.5)
QT INTERVAL: 414 MS
QTC INTERVAL: 465 MS
RBC # BLD AUTO: 4.04 10*6/MM3 (ref 3.77–5.28)
SODIUM SERPL-SCNC: 140 MMOL/L (ref 136–145)
WBC NRBC COR # BLD: 7.65 10*3/MM3 (ref 3.4–10.8)

## 2023-04-26 PROCEDURE — A9270 NON-COVERED ITEM OR SERVICE: HCPCS | Performed by: INTERNAL MEDICINE

## 2023-04-26 PROCEDURE — 99221 1ST HOSP IP/OBS SF/LOW 40: CPT

## 2023-04-26 PROCEDURE — 63710000001 MONTELUKAST 10 MG TABLET: Performed by: INTERNAL MEDICINE

## 2023-04-26 PROCEDURE — 63710000001 AMITRIPTYLINE 10 MG TABLET: Performed by: INTERNAL MEDICINE

## 2023-04-26 PROCEDURE — 63710000001 ACETAMINOPHEN 325 MG TABLET

## 2023-04-26 PROCEDURE — 74176 CT ABD & PELVIS W/O CONTRAST: CPT | Performed by: RADIOLOGY

## 2023-04-26 PROCEDURE — 63710000001 INSULIN LISPRO (HUMAN) PER 5 UNITS

## 2023-04-26 PROCEDURE — 63710000001 ATORVASTATIN 40 MG TABLET: Performed by: INTERNAL MEDICINE

## 2023-04-26 PROCEDURE — 82962 GLUCOSE BLOOD TEST: CPT

## 2023-04-26 PROCEDURE — 74176 CT ABD & PELVIS W/O CONTRAST: CPT

## 2023-04-26 PROCEDURE — 63710000001 FAMOTIDINE 20 MG TABLET: Performed by: INTERNAL MEDICINE

## 2023-04-26 PROCEDURE — 80053 COMPREHEN METABOLIC PANEL: CPT

## 2023-04-26 PROCEDURE — 99232 SBSQ HOSP IP/OBS MODERATE 35: CPT | Performed by: INTERNAL MEDICINE

## 2023-04-26 PROCEDURE — A9270 NON-COVERED ITEM OR SERVICE: HCPCS

## 2023-04-26 PROCEDURE — 85027 COMPLETE CBC AUTOMATED: CPT

## 2023-04-26 RX ADMIN — SODIUM CHLORIDE 100 ML/HR: 9 INJECTION, SOLUTION INTRAVENOUS at 15:29

## 2023-04-26 RX ADMIN — ACETAMINOPHEN 650 MG: 325 TABLET ORAL at 11:04

## 2023-04-26 RX ADMIN — INSULIN LISPRO 2 UNITS: 100 INJECTION, SOLUTION INTRAVENOUS; SUBCUTANEOUS at 17:13

## 2023-04-26 RX ADMIN — AMITRIPTYLINE HYDROCHLORIDE 10 MG: 10 TABLET, FILM COATED ORAL at 20:42

## 2023-04-26 RX ADMIN — MONTELUKAST SODIUM 10 MG: 10 TABLET, COATED ORAL at 11:04

## 2023-04-26 RX ADMIN — Medication 10 ML: at 11:04

## 2023-04-26 RX ADMIN — Medication 10 ML: at 20:42

## 2023-04-26 RX ADMIN — AMITRIPTYLINE HYDROCHLORIDE 10 MG: 10 TABLET, FILM COATED ORAL at 11:04

## 2023-04-26 RX ADMIN — ATORVASTATIN CALCIUM 40 MG: 40 TABLET, FILM COATED ORAL at 20:42

## 2023-04-26 RX ADMIN — FAMOTIDINE 20 MG: 20 TABLET, FILM COATED ORAL at 11:04

## 2023-04-26 RX ADMIN — ACETAMINOPHEN 650 MG: 325 TABLET ORAL at 17:13

## 2023-04-26 RX ADMIN — FAMOTIDINE 20 MG: 20 TABLET, FILM COATED ORAL at 20:42

## 2023-04-26 NOTE — PLAN OF CARE
Goal Outcome Evaluation:         Assisted pt up to bathroom, stand by assist x 1. Iv fluids infusing as ordered, pt denies needs

## 2023-04-26 NOTE — CONSULTS
"Diabetes Education  Assessment/Teaching    Patient Name:  Laxmi Higginbotham  YOB: 1950  MRN: [id number]  Admit Date:  4/25/2023      Assessment Date:  4/26/2023  Flowsheet Row Most Recent Value   General Information     Height 160 cm (63\")   Height Method Stated   Weight 70.8 kg (156 lb)  [New admit less than 24 hour]   Weight Method Bed scale   Pregnancy Assessment    Diabetes History    Education Preferences    Nutrition Information    Assessment Topics    DM Goals           Flowsheet Row Most Recent Value   DM Education Needs    Meter Has own   Frequency of Testing Daily   Medication Oral   Problem Solving Hypoglycemia, Hyperglycemia, Sick days, Symptoms, Signs, Treatment   Reducing Risks Immunizations, Foot care, Dental exam, Eye exam, Blood pressure, Lipids, A1C testing, Cardiovascular, Neuropathy, Retinopathy   Healthy Eating Basic meal plan provided   Physical Activity Walking   Physical Activity Frequency Occasionally   Healthy Coping Appropriate   Discharge Plan Follow-up with PCP, Home   Motivation Moderate   Teaching Method Explanation, Discussion, Handouts   Patient Response Verbalized understanding            Other Comments:  A1C 7.5 Patient was educated and received AADE7 and ADA handouts on diet, activity, checking blood glucose, taking medication as prescribed, checking feet daily and S/S of hypo/hyperglycemia. Patient was educated on sick rule days. Patient had no questions or concerns. Please re-consult or call for concerns or questions. Thank you.        Electronically signed by:  Brenda Fox RN  04/26/23 10:58 EDT  "

## 2023-04-26 NOTE — PROGRESS NOTES
[facility] HOSPITALIST PROGRESS NOTE     Patient Identification:  Name:  Laxmi Higginbotham  Age:  72 y.o.  Sex:  female  :  1950  MRN:  [id number]  Visit Number:  [id number]  ROOM: [address] [place] [address]     Primary Care Provider:  Amanda Pozo MD    Length of stay in inpatient status:  1    Subjective     Chief Compliant:  Flank Pain    History of Presenting Illness:    Patient remains ill but stable today, no acute events overnight, no new complaints, denies any fevers or chills, unsure if has passed stone or not, remains afebrile, labs and vitals stable, discussed with Urology this AM, obtained CT which showed no obstructing stones, remains NPO, follow up on plan to take to OR for cystoscopy and lithotripsy today, remains on IVFs.   Objective     Current Hospital Meds:  amitriptyline, 10 mg, Oral, BID  atorvastatin, 40 mg, Oral, Nightly  famotidine, 20 mg, Oral, BID  insulin lispro, 2-7 Units, Subcutaneous, TID With Meals  montelukast, 10 mg, Oral, Daily  pantoprazole, 40 mg, Oral, Q AM  polyethylene glycol, 17 g, Oral, Daily  sodium chloride, 10 mL, Intravenous, Q12H      sodium chloride, 100 mL/hr, Last Rate: 100 mL/hr (23 1656)      ----------------------------------------------------------------------------------------------------------------------  Vital Signs:  Temp:  [97.6 °F (36.4 °C)-98.8 °F (37.1 °C)] 98.3 °F (36.8 °C)  Heart Rate:  [] 74  Resp:  [16-20] 16  BP: (136-157)/(68-88) 157/82  SpO2:  [95 %-98 %] 95 %  on   ;      Body mass index is 27.63 kg/m².      Intake/Output Summary (Last 24 hours) at 2023 1301  Last data filed at 2023 0500  Gross per 24 hour   Intake 1680 ml   Output 550 ml   Net 1130 ml      ----------------------------------------------------------------------------------------------------------------------  Physical exam:  Constitutional:  Elderly.  No acute distress.      HENT:  Head:  Normocephalic and atraumatic.  Mouth:  Moist mucous membranes.     Eyes:  Conjunctivae and EOM are normal. No scleral icterus.    Neck:  Neck supple.  No JVD present.    Cardiovascular:  Normal rate, regular rhythm and normal heart sounds with no murmur.  Pulmonary/Chest:  No respiratory distress, no wheezes, no crackles, on room air  Abdominal:  Soft. No distension and no tenderness.   Musculoskeletal:  No tenderness, and no deformity.  No red or swollen joints anywhere.    Neurological:  Alert and oriented to person, place, and time.  No cranial nerve deficit.    Skin:  Skin is warm and dry. No rash noted. No pallor.   Peripheral vascular:  No clubbing, no cyanosis, no edema.  Psychiatric: Appropriate mood and affect  Edited by: Aurelio Key MD at 4/26/2023 1258  ----------------------------------------------------------------------------------------------------------------------  WBC/HGB/HCT/PLT   7.65/11.6/37.7/157 (04/26 0117)  BUN/CREAT/GLUC/ALT/AST/JOANNE/LIP    20/1.28/241/40/49/--/-- (04/26 0117)  LYTES - Na/K/Cl/CO2: 140/4.7/105/23.6 (04/26 0117)     Urine Culture   Date Value Ref Range Status   04/25/2023 25,000 CFU/mL Mixed Neha Isolated  Final     No results found for: BLOODCX    I have personally looked at the labs and they are summarized above.  ----------------------------------------------------------------------------------------------------------------------  Detailed radiology reports for the last 24 hours:  CT Abdomen Pelvis Stone Protocol    Result Date: 4/26/2023  1.  Bilateral nonobstructing kidney stones. No obstructive uropathy. 2.  Diverticulosis without diverticulitis. 3.  Fatty infiltration of liver. 4.  Other nonacute and incidental findings detailed above.  This report was finalized on 4/26/2023 10:32 AM by Dr. Kiran Larios MD.      Assessment & Plan    #Possible Acute Kidney Injury on presumed Chronic Kidney Disease stage unknown in setting of Obstructing Nephrolithiasis    - Urology consulted and following, CT obtained without noted obstruction  here as was seen on outside hospital CT, creatinine outside hospital was 1.49, here 1.2 and stable, remains on IVF, remains NPO for possible cystoscopy and lithotripsy, discussed with Urology today and will follow up on plan for intervention or not, no signs and symptoms of active infection, hold antibiotics.   - Trend Cr and urine output, avoid nephrotoxins, NSAIDs, dehydration and contrast as able.    #Hypertension/Hyperlipidemia  - Continue home Statin  - Hold home ACEI due to renal injury   - Continue to monitor on telemetry, strict I/O's, trend heart rate and blood pressure    #Non-Insulin Dependent Diabetes Mellitus Type II, uncontrolled, unknown complications  - Hgb A1c = 7.5%  - Holding home oral agents, continue FSBG and SSI, add long acting as indicated.    #Gastroesophageal Reflux Disease  - Continue home PPI & H2 blocker    #Anxiety/Depression  - Continue home regimen    #Former Smoker  - Recommended cessation, nicotine replacement therapy as needed    #History Migraines  - Supportive Care     #History Breast cancer status post R Lumpectomy  - Supportive Care    F: NPO, mIVFs  E: Monitor & Replace as needed   N: NPO Diet NPO Type: Strict NPO  PPx: SCDs  Code Status (Patient has no pulse and is not breathing): CPR (Attempt to Resuscitate)  Medical Interventions (Patient has pulse or is breathing): Full Support     Dispo: Pending workup and clinical improvement    *This patient is considered high risk secondary to obstructive uropathy, nephrolithiasis, Acute Kidney Injury? On Chronic Kidney Disease.    Edited by: Aurelio Key MD at 4/26/2023 1301  [facility]

## 2023-04-26 NOTE — CONSULTS
Name:  Laxmi Higginbotham  :  1950    DATE OF ADMISSION  2023    DATE OF CONSULT  2023     REFERRING PHYSICIAN  Provider,No Known    PRIMARY CARE PHYSICIAN  Amanda Pozo MD    REASON FOR CONSULT  Nephrolithiasis    CHIEF COMPLAINT  No chief complaint on file.      HISTORY OF PRESENT ILLNESS:   Laxmi Higginbotham is a 72 y.o. female with past medical history significant for non-insulin dependent type II DM, former tobacco abuse, history of breast cancer s/p lumpectomy, GERD, hyperlipidemia, essential hypertension, osteoarthritis, osteoporosis, and multiple instances of nephrolithiasis in the past s/p lithotripsy. Patient presents to [facility] Emergency Department today as a transfer from [facility] due to obstructing stone and JAN. Urology (Dr. Alexis) has been contacted and is agreeable to consult on patient. On exam, patient states that right-sided flank pain with radiation to the right lower abdominal quadrant awoke her from sleep this morning. She states that pain made her nauseous and she felt like she needed to vomit. She presented to [facility] at that time. Due to need for urology, she was transferred to our facility.    I saw Laxmi Higginbotham in their hospital room this morning.  Patient was lying in bed with no acute distress.  She denies any significant back or flank pain at this time.  She is well-known to our practice and is seen Dr. Alexis roughly 3 years ago for several  extracorporal shockwave lithotripsies due to significant stone burden's of her kidneys bilaterally.  CMP today shows slightly elevated creatinine at 1.28 and stable GFR of roughly 44.  She reports that she had a CT scan completed at [facility] that revealed a ureteral stone and bilateral intrarenal stones.  She denies any difficulty urinating, fever, chills, or gross hematuria.  UA completed at [facility] ER showed 1+ blood, 3+ glucose, and 1+ protein.  There were no other signs of infection noted.   She has remained n.p.o. this morning.    PAST MEDICAL HISTORY  Past Medical History:   Diagnosis Date   • Breast cancer     right breast   • Diabetes mellitus    • Elevated cholesterol    • GERD (gastroesophageal reflux disease)    • H/O seasonal allergies    • High cholesterol    • Hypertension    • Kidney stone    • Migraines    • Osteoarthritis    • Osteoporosis        PAST SURGICAL HISTORY  Past Surgical History:   Procedure Laterality Date   • ABDOMINAL SURGERY     • BREAST LUMPECTOMY      RIGHT   • COLONOSCOPY     • CYSTOSCOPY W/ URETERAL STENT PLACEMENT Left 6/19/2020    Procedure: CYSTOSCOPY URETERAL STENT REMOVAL;  Surgeon: Darrick Alexis MD;  Location: [facility];  Service: Urology;  Laterality: Left;   • CYSTOSCOPY, RETROGRADE PYELOGRAM AND STENT INSERTION Bilateral 6/8/2020    Procedure: CYSTOSCOPY& BILATERAL  URETEROSCOPY WITH LEFT STENT INSERTION & LASER LITHOTRIPSY OF THE RIGHT;  Surgeon: Darrick Alexis MD;  Location: [facility];  Service: Urology;  Laterality: Bilateral;   • EXTRACORPOREAL SHOCK WAVE LITHOTRIPSY (ESWL) Right 5/17/2019    Procedure: EXTRACORPOREAL SHOCKWAVE LITHOTRIPSY;  Surgeon: Darrick Alexis MD;  Location: [facility];  Service: Urology   • EXTRACORPOREAL SHOCK WAVE LITHOTRIPSY (ESWL) Left 6/19/2020    Procedure: EXTRACORPOREAL SHOCKWAVE LITHOTRIPSY;  Surgeon: Darrick Aelxis MD;  Location: [facility];  Service: Urology;  Laterality: Left;   • FEMUR SURGERY Right    • FRACTURE SURGERY Right    • GALLBLADDER SURGERY     • TUBAL ABDOMINAL LIGATION         SOCIAL HISTORY  Social History     Socioeconomic History   • Marital status:    Tobacco Use   • Smoking status: Former     Packs/day: 1.00     Years: 15.00     Pack years: 15.00     Types: Cigarettes   • Smokeless tobacco: Never   Vaping Use   • Vaping Use: Never used   Substance and Sexual Activity   • Alcohol use: No   • Drug use: No   • Sexual activity: Defer       FAMILY HISTORY  Family  History   Problem Relation Age of Onset   • Heart disease Father    • Cancer Mother        ALLERGIES  No Known Allergies    INPATIENT MEDICATIONS  Current Facility-Administered Medications   Medication Dose Route Frequency Provider Last Rate Last Admin   • acetaminophen (TYLENOL) tablet 650 mg  650 mg Oral Q6H PRN Clair Adhikari APRN   650 mg at 04/25/23 2058   • amitriptyline (ELAVIL) tablet 10 mg  10 mg Oral BID Aurelio Key MD   10 mg at 04/25/23 2058   • atorvastatin (LIPITOR) tablet 40 mg  40 mg Oral Nightly Aurelio Key MD   40 mg at 04/25/23 2058   • cetirizine (zyrTEC) tablet 10 mg  10 mg Oral Daily PRN Aurelio Key MD       • dextrose (D50W) (25 g/50 mL) IV injection 25 g  25 g Intravenous Q15 Min PRN Clair Adhikari APRN       • dextrose (GLUTOSE) oral gel 15 g  15 g Oral Q15 Min PRN Clair Adhikari APRN       • famotidine (PEPCID) tablet 20 mg  20 mg Oral BID Aurelio Key MD   20 mg at 04/25/23 2058   • glucagon HCl (Diagnostic) injection 1 mg  1 mg Intramuscular Q15 Min PRN Clair Adhikari APRN       • HYDROmorphone (DILAUDID) injection 0.5 mg  0.5 mg Intravenous Q4H PRN Aurelio Key MD   0.5 mg at 04/25/23 1743   • Insulin Lispro (humaLOG) injection 2-7 Units  2-7 Units Subcutaneous TID With Meals Clair Adhikari APRN   2 Units at 04/25/23 1742   • melatonin tablet 5 mg  5 mg Oral Nightly PRN Clair Adhikari APRN   5 mg at 04/25/23 2058   • montelukast (SINGULAIR) tablet 10 mg  10 mg Oral Daily Aurelio Key MD       • nitroglycerin (NITROSTAT) SL tablet 0.4 mg  0.4 mg Sublingual Q5 Min PRN Aurelio Key MD       • ondansetron (ZOFRAN) injection 4 mg  4 mg Intravenous Q6H PRN Clair Adhikari APRN       • pantoprazole (PROTONIX) EC tablet 40 mg  40 mg Oral Q AM Clair Adhikari APRN       • polyethylene glycol (MIRALAX) packet 17 g  17 g Oral Daily Clair Adhikari APRN       • sodium chloride 0.9 % flush 10 mL  10 mL Intravenous Q12H Aurelio Key MD   10 mL at 04/25/23 2059   • sodium  "chloride 0.9 % flush 10 mL  10 mL Intravenous PRN Aurelio Key MD       • sodium chloride 0.9 % infusion 40 mL  40 mL Intravenous PRN Aurelio Key MD       • sodium chloride 0.9 % infusion  100 mL/hr Intravenous Duong DormanyAGUEDA 100 mL/hr at 04/25/23 1656 100 mL/hr at 04/25/23 1656       REVIEW OF SYSTEMS  CONSTITUTIONAL:  No fever, chills, night sweats or fatigue.  EYES:  No blurry vision, diplopia or other vision changes.  ENT:  No hearing loss, nosebleeds or sore throat.  CARDIOVASCULAR:  No palpitations, arrhythmia, syncopal episodes or edema.  PULMONARY:  No hemoptysis, wheezing, chronic cough or shortness of breath.  GASTROINTESTINAL: Nausea, right-sided lower abdominal pain  GENITOURINARY: History of kidney stones.  MUSCULOSKELETAL: Right-sided back pain  INTEGUMENTARY: No rashes or pruritus.  ENDOCRINE:  No excessive thirst or hot flashes.  HEMATOLOGIC:  No history of free bleeding, spontaneous bleeding or clotting.  IMMUNOLOGIC:  No allergies or frequent infections.  NEUROLOGIC: No numbness, tingling, seizures or weakness.  PSYCHIATRIC:  No anxiety or depression.    PHYSICAL EXAMINATION    /75 (BP Location: Left arm, Patient Position: Lying)   Pulse 73   Temp 97.6 °F (36.4 °C) (Oral)   Resp 16   Ht 160 cm (63\")   Wt 70.8 kg (156 lb) Comment: New admit less than 24 hour  SpO2 98%   BMI 27.63 kg/m²     GENERAL:  A well-developed, well-nourished, white female in no acute distress.  HEENT:  Pupils equally round and reactive to light.  Extraocular muscles intact.  CARDIOVASCULAR:  Regular rate and rhythm.  No murmurs, gallops or rubs.  LUNGS:  Clear to auscultation bilaterally.  ABDOMEN:  Soft, nontender, nondistended with positive bowel sounds.  No CVA tenderness  EXTREMITIES:  No clubbing, cyanosis or edema bilaterally.  SKIN:  No rashes or petechiae.  NEURO:  Cranial nerves grossly intact.  No focal deficits.  PSYCH:  Alert and oriented x3.    LABORATORY     WBC   Date Value " Ref Range Status   04/26/2023 7.65 3.40 - 10.80 10*3/mm3 Final     RBC   Date Value Ref Range Status   04/26/2023 4.04 3.77 - 5.28 10*6/mm3 Final     Hemoglobin   Date Value Ref Range Status   04/26/2023 11.6 (L) 12.0 - 15.9 g/dL Final     Hematocrit   Date Value Ref Range Status   04/26/2023 37.7 34.0 - 46.6 % Final     MCV   Date Value Ref Range Status   04/26/2023 93.3 79.0 - 97.0 fL Final     MCH   Date Value Ref Range Status   04/26/2023 28.7 26.6 - 33.0 pg Final     MCHC   Date Value Ref Range Status   04/26/2023 30.8 (L) 31.5 - 35.7 g/dL Final     RDW   Date Value Ref Range Status   04/26/2023 13.9 12.3 - 15.4 % Final     RDW-SD   Date Value Ref Range Status   04/26/2023 48.2 37.0 - 54.0 fl Final     MPV   Date Value Ref Range Status   04/26/2023 10.2 6.0 - 12.0 fL Final     Platelets   Date Value Ref Range Status   04/26/2023 157 140 - 450 10*3/mm3 Final     Neutrophil %   Date Value Ref Range Status   04/25/2023 72.4 42.7 - 76.0 % Final     Lymphocyte %   Date Value Ref Range Status   04/25/2023 16.4 (L) 19.6 - 45.3 % Final     Monocyte %   Date Value Ref Range Status   04/25/2023 9.8 5.0 - 12.0 % Final     Eosinophil %   Date Value Ref Range Status   04/25/2023 0.2 (L) 0.3 - 6.2 % Final     Basophil %   Date Value Ref Range Status   04/25/2023 0.8 0.0 - 1.5 % Final     Immature Grans %   Date Value Ref Range Status   04/25/2023 0.4 0.0 - 0.5 % Final     Neutrophils, Absolute   Date Value Ref Range Status   04/25/2023 6.62 1.70 - 7.00 10*3/mm3 Final     Lymphocytes, Absolute   Date Value Ref Range Status   04/25/2023 1.50 0.70 - 3.10 10*3/mm3 Final     Monocytes, Absolute   Date Value Ref Range Status   04/25/2023 0.90 0.10 - 0.90 10*3/mm3 Final     Eosinophils, Absolute   Date Value Ref Range Status   04/25/2023 0.02 0.00 - 0.40 10*3/mm3 Final     Basophils, Absolute   Date Value Ref Range Status   04/25/2023 0.07 0.00 - 0.20 10*3/mm3 Final     Immature Grans, Absolute   Date Value Ref Range Status    04/25/2023 0.04 0.00 - 0.05 10*3/mm3 Final     nRBC   Date Value Ref Range Status   04/25/2023 0.0 0.0 - 0.2 /100 WBC Final       Glucose   Date Value Ref Range Status   04/26/2023 241 (H) 65 - 99 mg/dL Final     Sodium   Date Value Ref Range Status   04/26/2023 140 136 - 145 mmol/L Final     Potassium   Date Value Ref Range Status   04/26/2023 4.7 3.5 - 5.2 mmol/L Final     CO2   Date Value Ref Range Status   04/26/2023 23.6 22.0 - 29.0 mmol/L Final     Chloride   Date Value Ref Range Status   04/26/2023 105 98 - 107 mmol/L Final     Anion Gap   Date Value Ref Range Status   04/26/2023 11.4 5.0 - 15.0 mmol/L Final     Creatinine   Date Value Ref Range Status   04/26/2023 1.28 (H) 0.57 - 1.00 mg/dL Final     BUN   Date Value Ref Range Status   04/26/2023 20 8 - 23 mg/dL Final     BUN/Creatinine Ratio   Date Value Ref Range Status   04/26/2023 15.6 7.0 - 25.0 Final     Calcium   Date Value Ref Range Status   04/26/2023 8.4 (L) 8.6 - 10.5 mg/dL Final     Alkaline Phosphatase   Date Value Ref Range Status   04/26/2023 100 39 - 117 U/L Final     Total Protein   Date Value Ref Range Status   04/26/2023 6.4 6.0 - 8.5 g/dL Final     ALT (SGPT)   Date Value Ref Range Status   04/26/2023 40 (H) 1 - 33 U/L Final     AST (SGOT)   Date Value Ref Range Status   04/26/2023 49 (H) 1 - 32 U/L Final     Total Bilirubin   Date Value Ref Range Status   04/26/2023 0.5 0.0 - 1.2 mg/dL Final     Albumin   Date Value Ref Range Status   04/26/2023 3.6 3.5 - 5.2 g/dL Final     Globulin   Date Value Ref Range Status   04/26/2023 2.8 gm/dL Final       Magnesium   Date Value Ref Range Status   04/25/2023 1.7 1.6 - 2.4 mg/dL Final     No results found for: LDH, URICACID     IMAGING  Imaging Results (Last 72 Hours)     ** No results found for the last 72 hours. **          CT Abdomen and Pelvis: No results found for this or any previous visit.       CT Stone Protocol: Results for orders placed during the hospital encounter of 04/15/19    CT  Abdomen Pelvis Stone Protocol    Narrative  CT ABDOMEN PELVIS STONE PROTOCOL-    REASON FOR EXAM: Flank pain, recurrent stone disease suspected;  N20.0-Calculus of kidney.    FINDINGS: Spiral scans were obtained through the kidneys, ureter and  bladder. The kidneys show multiple calcifications in the intrarenal  collecting systems of both kidneys. The total stone volume is fairly  symmetric in the kidneys. The stones vary in size from 1 mm or so up to  6 to 7 mm. There is a stone in the proximal right ureter that measures 5  mm in diameter. The distal right and left ureter were unremarkable.  There were no stones in the urinary bladder. There is extensive  diverticula in the colon. There is marked fatty change throughout the  liver.    Impression  Nephrolithiasis with innumerable stones in the intrarenal  collecting systems of the kidneys. On the right side there is a 5 mm  stone in the proximal right ureter.    758.87 mGy.cm  The radiation dose reduction device was utilized for each scan per the  ALARA (as low as reasonably achievable) protocol.    This report was finalized on 4/22/2019 1:11 PM by Dr. Adán St II, MD.       KUB: Results for orders placed during the hospital encounter of 06/26/20    XR Abdomen KUB    Narrative  EXAMINATION: XR ABDOMEN KUB-    CLINICAL INDICATION: sones; N20.0-Calculus of kidney      COMPARISON: 06/08/2020    FINDINGS: One view of the abdomen demonstrates calcifications projecting  over the left abdomen compatible with left-sided renal stones.    Moderate stool is in the colon.    There is arthritic change in the lower lumbar spine.    Impression  Little overall change.    This report was finalized on 6/26/2020 3:04 PM by Dr. Deniz Steele MD.   \    LABS:   Admission on 04/25/2023   Component Date Value Ref Range Status   • Glucose 04/25/2023 228 (H)  65 - 99 mg/dL Final   • BUN 04/25/2023 15  8 - 23 mg/dL Final   • Creatinine 04/25/2023 1.27 (H)  0.57 - 1.00 mg/dL Final   •  Sodium 04/25/2023 140  136 - 145 mmol/L Final   • Potassium 04/25/2023 4.8  3.5 - 5.2 mmol/L Final    Slight hemolysis detected by analyzer. Results may be affected.   • Chloride 04/25/2023 104  98 - 107 mmol/L Final   • CO2 04/25/2023 22.1  22.0 - 29.0 mmol/L Final   • Calcium 04/25/2023 9.4  8.6 - 10.5 mg/dL Final   • Total Protein 04/25/2023 7.7  6.0 - 8.5 g/dL Final   • Albumin 04/25/2023 4.1  3.5 - 5.2 g/dL Final   • ALT (SGPT) 04/25/2023 45 (H)  1 - 33 U/L Final   • AST (SGOT) 04/25/2023 80 (H)  1 - 32 U/L Final   • Alkaline Phosphatase 04/25/2023 123 (H)  39 - 117 U/L Final   • Total Bilirubin 04/25/2023 0.8  0.0 - 1.2 mg/dL Final   • Globulin 04/25/2023 3.6  gm/dL Final   • A/G Ratio 04/25/2023 1.1  g/dL Final   • BUN/Creatinine Ratio 04/25/2023 11.8  7.0 - 25.0 Final   • Anion Gap 04/25/2023 13.9  5.0 - 15.0 mmol/L Final   • eGFR 04/25/2023 44.5 (L)  >60.0 mL/min/1.73 Final   • Color, UA 04/25/2023 Yellow  Yellow, Straw Final   • Appearance, UA 04/25/2023 Clear  Clear Final   • pH, UA 04/25/2023 5.5  5.0 - 8.0 Final   • Specific Gravity, UA 04/25/2023 1.017  1.005 - 1.030 Final   • Glucose, UA 04/25/2023 >=1000 mg/dL (3+) (A)  Negative Final   • Ketones, UA 04/25/2023 Negative  Negative Final   • Bilirubin, UA 04/25/2023 Negative  Negative Final   • Blood, UA 04/25/2023 Trace (A)  Negative Final   • Protein, UA 04/25/2023 Negative  Negative Final   • Leuk Esterase, UA 04/25/2023 Trace (A)  Negative Final   • Nitrite, UA 04/25/2023 Negative  Negative Final   • Urobilinogen, UA 04/25/2023 0.2 E.U./dL  0.2 - 1.0 E.U./dL Final   • WBC 04/25/2023 9.15  3.40 - 10.80 10*3/mm3 Final   • RBC 04/25/2023 4.81  3.77 - 5.28 10*6/mm3 Final   • Hemoglobin 04/25/2023 13.6  12.0 - 15.9 g/dL Final   • Hematocrit 04/25/2023 43.3  34.0 - 46.6 % Final   • MCV 04/25/2023 90.0  79.0 - 97.0 fL Final   • MCH 04/25/2023 28.3  26.6 - 33.0 pg Final   • MCHC 04/25/2023 31.4 (L)  31.5 - 35.7 g/dL Final   • RDW 04/25/2023 13.7  12.3 -  15.4 % Final   • RDW-SD 04/25/2023 45.0  37.0 - 54.0 fl Final   • MPV 04/25/2023 9.7  6.0 - 12.0 fL Final   • Platelets 04/25/2023 186  140 - 450 10*3/mm3 Final   • Neutrophil % 04/25/2023 72.4  42.7 - 76.0 % Final   • Lymphocyte % 04/25/2023 16.4 (L)  19.6 - 45.3 % Final   • Monocyte % 04/25/2023 9.8  5.0 - 12.0 % Final   • Eosinophil % 04/25/2023 0.2 (L)  0.3 - 6.2 % Final   • Basophil % 04/25/2023 0.8  0.0 - 1.5 % Final   • Immature Grans % 04/25/2023 0.4  0.0 - 0.5 % Final   • Neutrophils, Absolute 04/25/2023 6.62  1.70 - 7.00 10*3/mm3 Final   • Lymphocytes, Absolute 04/25/2023 1.50  0.70 - 3.10 10*3/mm3 Final   • Monocytes, Absolute 04/25/2023 0.90  0.10 - 0.90 10*3/mm3 Final   • Eosinophils, Absolute 04/25/2023 0.02  0.00 - 0.40 10*3/mm3 Final   • Basophils, Absolute 04/25/2023 0.07  0.00 - 0.20 10*3/mm3 Final   • Immature Grans, Absolute 04/25/2023 0.04  0.00 - 0.05 10*3/mm3 Final   • nRBC 04/25/2023 0.0  0.0 - 0.2 /100 WBC Final   • Glucose 04/25/2023 197 (H)  70 - 130 mg/dL Final    Meter: [id number] : [id number] MAME WEBBER   • COVID19 04/25/2023 Not Detected  Not Detected - Ref. Range Final   • Influenza A PCR 04/25/2023 Not Detected  Not Detected Final   • Influenza B PCR 04/25/2023 Not Detected  Not Detected Final   • RBC, UA 04/25/2023 3-5 (A)  None Seen, 0-2 /HPF Final   • WBC, UA 04/25/2023 6-12 (A)  None Seen, 0-2 /HPF Final   • Bacteria, UA 04/25/2023 None Seen  None Seen /HPF Final   • Squamous Epithelial Cells, UA 04/25/2023 0-2  None Seen, 0-2 /HPF Final   • Hyaline Casts, UA 04/25/2023 None Seen  None Seen /LPF Final   • Methodology 04/25/2023 Automated Microscopy   Final   • Urine Culture 04/25/2023 25,000 CFU/mL Mixed Neha Isolated   Final   • QT Interval 04/25/2023 414  ms Preliminary   • QTC Interval 04/25/2023 465  ms Preliminary   • Total Cholesterol 04/25/2023 192  0 - 200 mg/dL Final   • Triglycerides 04/25/2023 168 (H)  0 - 150 mg/dL Final   • HDL Cholesterol 04/25/2023 54  40 - 60  mg/dL Final   • LDL Cholesterol  04/25/2023 109 (H)  0 - 100 mg/dL Final   • VLDL Cholesterol 04/25/2023 29  5 - 40 mg/dL Final   • LDL/HDL Ratio 04/25/2023 1.93   Final   • Hemoglobin A1C 04/25/2023 7.50 (H)  4.80 - 5.60 % Final   • Magnesium 04/25/2023 1.7  1.6 - 2.4 mg/dL Final   • Glucose 04/25/2023 197 (H)  70 - 130 mg/dL Final    Meter: [id number] : [id number] MAME WEBBER   • Glucose 04/25/2023 215 (H)  70 - 130 mg/dL Final    Meter: [id number] : [id number] OLGA SALAZAR   • WBC 04/26/2023 7.65  3.40 - 10.80 10*3/mm3 Final   • RBC 04/26/2023 4.04  3.77 - 5.28 10*6/mm3 Final   • Hemoglobin 04/26/2023 11.6 (L)  12.0 - 15.9 g/dL Final   • Hematocrit 04/26/2023 37.7  34.0 - 46.6 % Final   • MCV 04/26/2023 93.3  79.0 - 97.0 fL Final   • MCH 04/26/2023 28.7  26.6 - 33.0 pg Final   • MCHC 04/26/2023 30.8 (L)  31.5 - 35.7 g/dL Final   • RDW 04/26/2023 13.9  12.3 - 15.4 % Final   • RDW-SD 04/26/2023 48.2  37.0 - 54.0 fl Final   • MPV 04/26/2023 10.2  6.0 - 12.0 fL Final   • Platelets 04/26/2023 157  140 - 450 10*3/mm3 Final   • Glucose 04/26/2023 241 (H)  65 - 99 mg/dL Final   • BUN 04/26/2023 20  8 - 23 mg/dL Final   • Creatinine 04/26/2023 1.28 (H)  0.57 - 1.00 mg/dL Final   • Sodium 04/26/2023 140  136 - 145 mmol/L Final   • Potassium 04/26/2023 4.7  3.5 - 5.2 mmol/L Final   • Chloride 04/26/2023 105  98 - 107 mmol/L Final   • CO2 04/26/2023 23.6  22.0 - 29.0 mmol/L Final   • Calcium 04/26/2023 8.4 (L)  8.6 - 10.5 mg/dL Final   • Total Protein 04/26/2023 6.4  6.0 - 8.5 g/dL Final   • Albumin 04/26/2023 3.6  3.5 - 5.2 g/dL Final   • ALT (SGPT) 04/26/2023 40 (H)  1 - 33 U/L Final   • AST (SGOT) 04/26/2023 49 (H)  1 - 32 U/L Final   • Alkaline Phosphatase 04/26/2023 100  39 - 117 U/L Final   • Total Bilirubin 04/26/2023 0.5  0.0 - 1.2 mg/dL Final   • Globulin 04/26/2023 2.8  gm/dL Final   • A/G Ratio 04/26/2023 1.3  g/dL Final   • BUN/Creatinine Ratio 04/26/2023 15.6  7.0 - 25.0 Final   • Anion Gap 04/26/2023 11.4   5.0 - 15.0 mmol/L Final   • eGFR 04/26/2023 44.1 (L)  >60.0 mL/min/1.73 Final   • Glucose 04/26/2023 146 (H)  70 - 130 mg/dL Final    Meter: [id number] : [id number] ZAHRAA ESTEVEZ        PATHOLOGY  * Cannot find OR log *    IMPRESSION AND PLAN  Laxmi Higginbotham is a 72 y.o., white female with: Bilateral nephrolithiasis and a distal right ureteral calculus causing some mild obstruction according to report from [facility].  Patient has not had any pain this morning.  She is unsure if she has passed the stone.  Recommending to have a CT scan completed prior to surgery to evaluate stones location.  Did discuss with the patient the use of uteroscopy with laser lithotripsy.  Described this procedure in detail including risks and benefits.  We will obtain a stat CT and if stone is still identified we will schedule her for surgery as appropriate.  I discussed this case with Dr. Alexis and he is in agreements.      The patient was in agreement with these plans.    Thank you for asking us to participate in Laxmi Higginbotham's care.  We will continue to follow with you.  Please do not hesitate to call with any questions or concerns that you may have.    A total of 60 minutes were spent coordinating this patient’s care in clinic today; 30 minutes of which were face-to-face with the patient, reviewing medical history and counseling on the current treatment and followup plan.  All questions were answered to patient's satisfaction.         This document has been electronically signed by Eduard Maxwell PA-C  April 26, 2023 08:57 EDT    Part of this note may be an electronic transcription/translation of spoken language to printed text using the Dragon Dictation System.

## 2023-04-26 NOTE — PROGRESS NOTES
Progress note urology    I reviewed the patient's most recent CT that we completed prior to surgery and there is no obstructive uropathy noted.  She does have bilateral nonobstructing kidney stones however these are very minimal and less than 2 mm in size.  She does not require any surgery at this time.  She did have a previous ureteral stone it looks like she has passed it.  She can follow-up with us in outpatient setting to discuss possible future extracorporal shockwave lithotripsy if needed.  I discussed this with Vanesa and he is in agreements.    Eduard Maxwell PA-C

## 2023-04-27 ENCOUNTER — READMISSION MANAGEMENT (OUTPATIENT)
Dept: CALL CENTER | Facility: HOSPITAL | Age: 73
End: 2023-04-27
Payer: MEDICARE

## 2023-04-27 VITALS
BODY MASS INDEX: 27.98 KG/M2 | RESPIRATION RATE: 18 BRPM | DIASTOLIC BLOOD PRESSURE: 79 MMHG | OXYGEN SATURATION: 97 % | WEIGHT: 157.9 LBS | TEMPERATURE: 98.3 F | SYSTOLIC BLOOD PRESSURE: 156 MMHG | HEART RATE: 78 BPM | HEIGHT: 63 IN

## 2023-04-27 LAB
ANION GAP SERPL CALCULATED.3IONS-SCNC: 7.7 MMOL/L (ref 5–15)
BACTERIA UR QL AUTO: ABNORMAL /HPF
BILIRUB UR QL STRIP: NEGATIVE
BUN SERPL-MCNC: 13 MG/DL (ref 8–23)
BUN/CREAT SERPL: 12.3 (ref 7–25)
CALCIUM SPEC-SCNC: 8.4 MG/DL (ref 8.6–10.5)
CHLORIDE SERPL-SCNC: 110 MMOL/L (ref 98–107)
CLARITY UR: CLEAR
CO2 SERPL-SCNC: 24.3 MMOL/L (ref 22–29)
COLOR UR: YELLOW
CREAT SERPL-MCNC: 1.06 MG/DL (ref 0.57–1)
EGFRCR SERPLBLD CKD-EPI 2021: 55.2 ML/MIN/1.73
GLUCOSE BLDC GLUCOMTR-MCNC: 166 MG/DL (ref 70–130)
GLUCOSE SERPL-MCNC: 189 MG/DL (ref 65–99)
GLUCOSE UR STRIP-MCNC: ABNORMAL MG/DL
HGB UR QL STRIP.AUTO: ABNORMAL
HYALINE CASTS UR QL AUTO: ABNORMAL /LPF
KETONES UR QL STRIP: NEGATIVE
LEUKOCYTE ESTERASE UR QL STRIP.AUTO: ABNORMAL
NITRITE UR QL STRIP: NEGATIVE
PH UR STRIP.AUTO: 5.5 [PH] (ref 5–8)
POTASSIUM SERPL-SCNC: 4.2 MMOL/L (ref 3.5–5.2)
PROT UR QL STRIP: NEGATIVE
RBC # UR STRIP: ABNORMAL /HPF
REF LAB TEST METHOD: ABNORMAL
SODIUM SERPL-SCNC: 142 MMOL/L (ref 136–145)
SP GR UR STRIP: 1.01 (ref 1–1.03)
SQUAMOUS #/AREA URNS HPF: ABNORMAL /HPF
UROBILINOGEN UR QL STRIP: ABNORMAL
WBC # UR STRIP: ABNORMAL /HPF

## 2023-04-27 PROCEDURE — G0378 HOSPITAL OBSERVATION PER HR: HCPCS

## 2023-04-27 PROCEDURE — 82962 GLUCOSE BLOOD TEST: CPT

## 2023-04-27 PROCEDURE — 63710000001 PANTOPRAZOLE 40 MG TABLET DELAYED-RELEASE

## 2023-04-27 PROCEDURE — A9270 NON-COVERED ITEM OR SERVICE: HCPCS | Performed by: INTERNAL MEDICINE

## 2023-04-27 PROCEDURE — 87086 URINE CULTURE/COLONY COUNT: CPT | Performed by: INTERNAL MEDICINE

## 2023-04-27 PROCEDURE — 63710000001 MONTELUKAST 10 MG TABLET: Performed by: INTERNAL MEDICINE

## 2023-04-27 PROCEDURE — A9270 NON-COVERED ITEM OR SERVICE: HCPCS

## 2023-04-27 PROCEDURE — 63710000001 INSULIN LISPRO (HUMAN) PER 5 UNITS

## 2023-04-27 PROCEDURE — 80048 BASIC METABOLIC PNL TOTAL CA: CPT | Performed by: INTERNAL MEDICINE

## 2023-04-27 PROCEDURE — 63710000001 AMITRIPTYLINE 10 MG TABLET: Performed by: INTERNAL MEDICINE

## 2023-04-27 PROCEDURE — 63710000001 FAMOTIDINE 20 MG TABLET: Performed by: INTERNAL MEDICINE

## 2023-04-27 PROCEDURE — 99238 HOSP IP/OBS DSCHRG MGMT 30/<: CPT | Performed by: INTERNAL MEDICINE

## 2023-04-27 PROCEDURE — 96361 HYDRATE IV INFUSION ADD-ON: CPT

## 2023-04-27 PROCEDURE — 81001 URINALYSIS AUTO W/SCOPE: CPT | Performed by: INTERNAL MEDICINE

## 2023-04-27 RX ADMIN — AMITRIPTYLINE HYDROCHLORIDE 10 MG: 10 TABLET, FILM COATED ORAL at 08:56

## 2023-04-27 RX ADMIN — FAMOTIDINE 20 MG: 20 TABLET, FILM COATED ORAL at 08:56

## 2023-04-27 RX ADMIN — PANTOPRAZOLE SODIUM 40 MG: 40 TABLET, DELAYED RELEASE ORAL at 05:43

## 2023-04-27 RX ADMIN — Medication 10 ML: at 08:58

## 2023-04-27 RX ADMIN — SODIUM CHLORIDE 100 ML/HR: 9 INJECTION, SOLUTION INTRAVENOUS at 05:43

## 2023-04-27 RX ADMIN — MONTELUKAST SODIUM 10 MG: 10 TABLET, COATED ORAL at 08:58

## 2023-04-27 RX ADMIN — INSULIN LISPRO 2 UNITS: 100 INJECTION, SOLUTION INTRAVENOUS; SUBCUTANEOUS at 08:56

## 2023-04-27 NOTE — OUTREACH NOTE
Prep Survey    Flowsheet Row Responses   [facility]? [facility]   Is LACE score < 7 ? No   Eligibility Readm Mgmt   Discharge diagnosis Nephrolithiasis   Does the patient have one of the following disease processes/diagnoses(primary or secondary)? Other   Does the patient have Home health ordered? No   Is there a DME ordered? No   Prep survey completed? Yes          Maggi STYLES - Registered Nurse

## 2023-04-27 NOTE — PLAN OF CARE
Goal Outcome Evaluation:      Patient to be discharged on this date. IV and tele removed.

## 2023-04-27 NOTE — DISCHARGE INSTR - APPOINTMENTS
Pt  has  an  apointment  sonia  perdomo  for   may 3 at 10 ;15  and  arie  with  urology   for  may 17  at 3:15

## 2023-04-27 NOTE — PLAN OF CARE
Goal Outcome Evaluation:      Patient resting in bed throughout shift at this time. Patient is A&Ox4, RA, and is NSR on the cardiac monitor. VSS. Patient denies any distress and discomfort at this time. Will continue to follow plan of care throughout shift.

## 2023-04-27 NOTE — DISCHARGE SUMMARY
[facility] HOSPITALISTS DISCHARGE SUMMARY    Patient Identification:  Name:  Laxmi Higginbotham  Age:  72 y.o.  Sex:  female  :  1950  MRN:  [id number]  Visit Number:  [id number]    Date of Admission: 2023  Date of Discharge:  2023     PCP: Amanda Pozo MD    DISCHARGE DIAGNOSIS  Acute Kidney Injury on Chronic Kidney Disease stage II-III - JAN Resolved  Obstructing Nephrolithiasis, though with spontaneous passage, ruled out UTI - Improved   Hypertension  Hyperlipidemia  Non-Insulin Dependent Diabetes Mellitus Type II, uncontrolled, unknown complications  Gastroesophageal Reflux Diseasea  Anxiety/Depression  Former Smoker  History Migraines  History Breast cancer status post R Lumpectomy    CONSULTS   Urology     PROCEDURES PERFORMED  None    HOSPITAL COURSE  Patient is a 72 y.o. female presented to [facility] complaining of flank pain.  Please see the admitting history and physical for further details.      #Acute Kidney Injury on Chronic Kidney Disease stage II-III in setting of Obstructing Nephrolithiasis, though with spontaneous passage, rule out UTI    Patient presented to outside hospital with acute onset flank pain, creatinine noted up at 1.49, CT imaging there with noted obstruction and hydronephrosis, transferred here  for Urology evaluation, patient was started on IVFs, creatinine 1.2 on admission, Urology consulted and followed, obtained repeat CT which showed no obstruction and suspect stone has passed on it's own, creatinine today down to 1.0, Urology without plans for intervention, patient without fevers though did endorse some chills and dysuria today, repeating UA and culture prior to discharge, patient non-toxic and non-septic appearing, feels at her baseline otherwise, will prescribe antibiotics at discharge if indicated per UA, follow up PCP 1 week, follow up Urology 1-2 weeks.   - Update - UA with pyuria but not concerning for Urinary Tract Infection,  culture pending, will hold on antibiotics for now, will call in antibiotics if culture ends up growing infectious organism.   - Update - Urine culture returned no growth, nothing to do.     #Hypertension/Hyperlipidemia  Continue home Statin, resumed home ACEI upon discharge.  Follow up PCP 1 week for BMP and blood pressure check.    #Non-Insulin Dependent Diabetes Mellitus Type II, uncontrolled, unknown complications  Hgb A1c = 7.5%.  Resumed home regimen, follow up PCP 1 week for glucose check.     #Gastroesophageal Reflux Disease  Continued home PPI & H2 blocker    #Anxiety/Depression  Continued home regimen    #Former Smoker  Recommended cessation, nicotine replacement therapy as needed    #History Migraines  Supportive Care     #History Breast cancer status post R Lumpectomy  Supportive Care    Edited by: Aurelio Key MD at 4/27/2023 0930    VITAL SIGNS:  Temp:  [97.4 °F (36.3 °C)-98.9 °F (37.2 °C)] 98.3 °F (36.8 °C)  Heart Rate:  [69-83] 78  Resp:  [16-18] 18  BP: (156-169)/(79-85) 156/79  SpO2:  [95 %-98 %] 97 %  on   ;        Body mass index is 27.97 kg/m².  Wt Readings from Last 3 Encounters:   04/27/23 71.6 kg (157 lb 14.4 oz)   06/26/20 66.2 kg (146 lb)   06/19/20 65.8 kg (145 lb)     PHYSICAL EXAM:  Constitutional:  Elderly.  No acute distress.      HENT:  Head:  Normocephalic and atraumatic.  Mouth:  Moist mucous membranes.    Eyes:  Conjunctivae and EOM are normal. No scleral icterus.    Neck:  Neck supple.  No JVD present.    Cardiovascular:  Normal rate, regular rhythm and normal heart sounds with no murmur.  Pulmonary/Chest:  No respiratory distress, no wheezes, no crackles, on room air  Abdominal:  Soft. No distension and no tenderness.   Musculoskeletal:  No tenderness, and no deformity.  No red or swollen joints anywhere.    Neurological:  Alert and oriented to person, place, and time.  No gross focal deficits   Skin:  Skin is warm and dry. No rash noted. No pallor.   Peripheral vascular:  No  clubbing, no cyanosis, no edema.  Psychiatric: Appropriate mood and affect  Edited by: Aurelio Key MD at 4/27/2023 0930    DISCHARGE DISPOSITION   Stable    DISCHARGE MEDICATIONS:     Discharge Medications      Continue These Medications      Instructions Start Date   amitriptyline 10 MG tablet  Commonly known as: ELAVIL   10 mg, Oral, 2 Times Daily      atorvastatin 40 MG tablet  Commonly known as: LIPITOR   40 mg, Oral, Nightly      cetirizine 10 MG tablet  Commonly known as: zyrTEC   10 mg, Oral, Daily PRN      denosumab 60 MG/ML solution prefilled syringe syringe  Commonly known as: PROLIA   60 mg, Subcutaneous, Every 6 Months      famotidine 20 MG tablet  Commonly known as: PEPCID   20 mg, Oral, 2 Times Daily      glipizide 5 MG tablet  Commonly known as: GLUCOTROL   5 mg, Oral, Daily      lisinopril 2.5 MG tablet  Commonly known as: PRINIVIL,ZESTRIL   2.5 mg, Oral, Daily      montelukast 10 MG tablet  Commonly known as: SINGULAIR   10 mg, Oral, Daily      pantoprazole 40 MG EC tablet  Commonly known as: PROTONIX   40 mg, Oral, Daily      SITagliptin 100 MG tablet  Commonly known as: JANUVIA   100 mg, Oral, Daily               Additional Instructions for the Follow-ups that You Need to Schedule     Discharge Follow-up with PCP   As directed       Currently Documented PCP:    Amanda Pozo MD    PCP Phone Number:    [phone]     Follow Up Details: 1 week post hospital discharge         Discharge Follow-up with Specified Provider: Dr. Alexis 1-2 weeks   As directed      To: Dr. Alexis 1-2 weeks            Follow-up Information     Amanda Pozo MD .    Specialty: Internal Medicine  Why: 1 week post hospital discharge  Contact information:  [address] [place] [address]  [phone]                          TEST  RESULTS PENDING AT DISCHARGE  Pending Labs     Order Current Status    Urinalysis With Culture If Indicated - Urine, Clean Catch Collected (04/27/23 0855)    Blood Culture - Blood, Arm,  Left Preliminary result    Blood Culture - Blood, Hand, Left Preliminary result        CODE STATUS  Code Status and Medical Interventions:   Ordered at: 04/25/23 1530     Code Status (Patient has no pulse and is not breathing):    CPR (Attempt to Resuscitate)     Medical Interventions (Patient has pulse or is breathing):    Full Support     Aurelio Key MD  [facility] Hospitalist  04/27/23  09:31 EDT    Please note that this discharge summary required 25 minutes to complete.

## 2023-04-28 LAB — BACTERIA SPEC AEROBE CULT: NO GROWTH

## 2023-04-30 LAB
BACTERIA SPEC AEROBE CULT: NORMAL
BACTERIA SPEC AEROBE CULT: NORMAL

## 2023-05-02 ENCOUNTER — READMISSION MANAGEMENT (OUTPATIENT)
Dept: CALL CENTER | Facility: HOSPITAL | Age: 73
End: 2023-05-02
Payer: MEDICARE

## 2023-05-02 NOTE — OUTREACH NOTE
Medical Week 1 Survey    Flowsheet Row Responses   [facility] patient discharged from? Too   Does the patient have one of the following disease processes/diagnoses(primary or secondary)? Other   Week 1 attempt successful? Yes   Call start time 1544   Call end time 1547   Discharge diagnosis Nephrolithiasis   Meds reviewed with patient/caregiver? Yes   Is the patient having any side effects they believe may be caused by any medication additions or changes? No   Does the patient have all medications ordered at discharge? N/A   Is the patient taking all medications as directed (includes completed medication regime)? Yes   Does the patient have a primary care provider?  Yes   Does the patient have an appointment with their PCP within 7 days of discharge? Greater than 7 days   Comments regarding PCP Dr Pozo no longer PCP, current PCP, AGUEDA Pritchett   What is preventing the patient from scheduling follow up appointments within 7 days of discharge? Earlier appointment not available   Nursing Interventions Verified appointment date/time/provider   Has the patient kept scheduled appointments due by today? N/A   Has home health visited the patient within 72 hours of discharge? N/A   Psychosocial issues? No   Did the patient receive a copy of their discharge instructions? Yes   Nursing interventions Reviewed instructions with patient   What is the patient's perception of their health status since discharge? Improving   Is the patient/caregiver able to teach back signs and symptoms related to disease process for when to call PCP? Yes   Is the patient/caregiver able to teach back signs and symptoms related to disease process for when to call 911? Yes   Is the patient/caregiver able to teach back the hierarchy of who to call/visit for symptoms/problems? PCP, Specialist, Home health nurse, Urgent Care, ED, 911 Yes   If the patient is a current smoker, are they able to teach back resources for cessation? Not a smoker    Additional teach back comments denies pain, fever or chills   Week 1 call completed? Yes          Crystal HERRERA - Registered Nurse

## 2023-05-10 ENCOUNTER — READMISSION MANAGEMENT (OUTPATIENT)
Dept: CALL CENTER | Facility: HOSPITAL | Age: 73
End: 2023-05-10
Payer: MEDICARE

## 2023-05-10 NOTE — OUTREACH NOTE
Medical Week 2 Survey    Flowsheet Row Responses   [facility] patient discharged from? Too   Does the patient have one of the following disease processes/diagnoses(primary or secondary)? Other   Week 2 attempt successful? Yes   Call start time 1055   Discharge diagnosis Nephrolithiasis   Call end time 1055   Meds reviewed with patient/caregiver? Yes   Is the patient having any side effects they believe may be caused by any medication additions or changes? No   Does the patient have all medications ordered at discharge? Yes   Is the patient taking all medications as directed (includes completed medication regime)? Yes   Does the patient have a primary care provider?  Yes   Does the patient have an appointment with their PCP within 7 days of discharge? Yes   Has the patient kept scheduled appointments due by today? N/A   Has home health visited the patient within 72 hours of discharge? N/A   Psychosocial issues? No   What is the patient's perception of their health status since discharge? Improving   Week 2 Call Completed? Yes   Graduated Yes          Amarilys Johnson Registered Nurse

## 2023-05-17 ENCOUNTER — OFFICE VISIT (OUTPATIENT)
Dept: UROLOGY | Facility: CLINIC | Age: 73
End: 2023-05-17
Payer: MEDICARE

## 2023-05-17 DIAGNOSIS — R35.0 FREQUENCY OF URINATION: ICD-10-CM

## 2023-05-17 DIAGNOSIS — N20.0 NEPHROLITHIASIS: Primary | ICD-10-CM

## 2023-05-17 LAB
BILIRUB BLD-MCNC: NEGATIVE MG/DL
CLARITY, POC: CLEAR
COLOR UR: YELLOW
EXPIRATION DATE: NORMAL
GLUCOSE UR STRIP-MCNC: NEGATIVE MG/DL
KETONES UR QL: NEGATIVE
LEUKOCYTE EST, POC: NEGATIVE
Lab: NORMAL
NITRITE UR-MCNC: NEGATIVE MG/ML
PH UR: 6 [PH] (ref 5–8)
PROT UR STRIP-MCNC: NEGATIVE MG/DL
RBC # UR STRIP: NEGATIVE /UL
SP GR UR: 1.02 (ref 1–1.03)
UROBILINOGEN UR QL: NORMAL

## 2023-05-17 RX ORDER — TAMSULOSIN HYDROCHLORIDE 0.4 MG/1
1 CAPSULE ORAL DAILY
Qty: 30 CAPSULE | Refills: 0 | Status: SHIPPED | OUTPATIENT
Start: 2023-05-17

## 2023-05-17 NOTE — PROGRESS NOTES
Chief Complaint:    Chief Complaint   Patient presents with   • Nephrolithiasis     Hospital follow up        Vital Signs:   There were no vitals taken for this visit.  There is no height or weight on file to calculate BMI.      HPI:  Laxmi Donovan is a 72 y.o. female who presents today for hospital follow up    History of Present Illness  Ms. donovan presents to the clinic for hospital follow-up.  Patient was initially transferred from [facility] and accepted to our facility for right-sided ureteral calculi causing obstruction and moderate hydronephrosis.  Patient had a CT scan completed prior to stone removal by Dr. Alexis and there was no stones identified along the course of the ureters.  She had small nonobstructing bilateral intrarenal calculi measuring less than 2 mm.  Her surgery was canceled and patient was discharged home.  She states that she had pain for roughly 2 days post discharge but this is since resolved.  She denies any lower urinary tract symptoms at this time including but not limited to fever, chills, dysuria, gross hematuria, difficulty urinating, or back/flank pain.  She has no other complaints at this time.      Past Medical History:  Past Medical History:   Diagnosis Date   • Breast cancer     right breast   • Diabetes mellitus    • Elevated cholesterol    • GERD (gastroesophageal reflux disease)    • H/O seasonal allergies    • High cholesterol    • Hypertension    • Kidney stone    • Migraines    • Osteoarthritis    • Osteoporosis        Current Meds:  Current Outpatient Medications   Medication Sig Dispense Refill   • amitriptyline (ELAVIL) 10 MG tablet Take 1 tablet by mouth 2 (Two) Times a Day.     • atorvastatin (LIPITOR) 40 MG tablet Take 1 tablet by mouth Every Night.     • cetirizine (zyrTEC) 10 MG tablet Take 1 tablet by mouth Daily As Needed for Allergies.     • denosumab (PROLIA) 60 MG/ML solution prefilled syringe syringe Inject 1 mL under the skin into the appropriate  area as directed Every 6 (Six) Months.     • famotidine (PEPCID) 20 MG tablet Take 1 tablet by mouth 2 (Two) Times a Day.     • glipizide (GLUCOTROL) 5 MG tablet Take 1 tablet by mouth Daily.     • lisinopril (PRINIVIL,ZESTRIL) 2.5 MG tablet Take 1 tablet by mouth Daily.     • montelukast (SINGULAIR) 10 MG tablet Take 1 tablet by mouth Daily.     • pantoprazole (PROTONIX) 40 MG EC tablet Take 1 tablet by mouth Daily.     • SITagliptin (JANUVIA) 100 MG tablet Take 1 tablet by mouth Daily.     • tamsulosin (FLOMAX) 0.4 MG capsule 24 hr capsule Take 1 capsule by mouth Daily. 30 capsule 0     No current facility-administered medications for this visit.        Allergies:   No Known Allergies     Past Surgical History:  Past Surgical History:   Procedure Laterality Date   • ABDOMINAL SURGERY     • BREAST LUMPECTOMY      RIGHT   • COLONOSCOPY     • CYSTOSCOPY W/ URETERAL STENT PLACEMENT Left 6/19/2020    Procedure: CYSTOSCOPY URETERAL STENT REMOVAL;  Surgeon: Darrick Alexis MD;  Location: [facility];  Service: Urology;  Laterality: Left;   • CYSTOSCOPY, RETROGRADE PYELOGRAM AND STENT INSERTION Bilateral 6/8/2020    Procedure: CYSTOSCOPY& BILATERAL  URETEROSCOPY WITH LEFT STENT INSERTION & LASER LITHOTRIPSY OF THE RIGHT;  Surgeon: Darrick Alexis MD;  Location: [facility];  Service: Urology;  Laterality: Bilateral;   • EXTRACORPOREAL SHOCK WAVE LITHOTRIPSY (ESWL) Right 5/17/2019    Procedure: EXTRACORPOREAL SHOCKWAVE LITHOTRIPSY;  Surgeon: Darrick Alexis MD;  Location: [facility];  Service: Urology   • EXTRACORPOREAL SHOCK WAVE LITHOTRIPSY (ESWL) Left 6/19/2020    Procedure: EXTRACORPOREAL SHOCKWAVE LITHOTRIPSY;  Surgeon: Darrick Alexis MD;  Location: [facility];  Service: Urology;  Laterality: Left;   • FEMUR SURGERY Right    • FRACTURE SURGERY Right    • GALLBLADDER SURGERY     • TUBAL ABDOMINAL LIGATION         Social History:  Social History     Socioeconomic History   • Marital status:     Tobacco Use   • Smoking status: Former     Packs/day: 1.00     Years: 15.00     Pack years: 15.00     Types: Cigarettes   • Smokeless tobacco: Never   Vaping Use   • Vaping Use: Never used   Substance and Sexual Activity   • Alcohol use: No   • Drug use: No   • Sexual activity: Defer       Family History:  Family History   Problem Relation Age of Onset   • Heart disease Father    • Cancer Mother        Review of Systems:  Review of Systems   Constitutional: Negative for fatigue, fever and unexpected weight change.   Respiratory: Negative for chest tightness and shortness of breath.    Cardiovascular: Negative for chest pain.   Gastrointestinal: Negative for abdominal pain, constipation, diarrhea, nausea and vomiting.   Genitourinary: Negative for decreased urine volume, difficulty urinating, dysuria, flank pain, frequency, hematuria and urgency.   Skin: Negative for rash.   Psychiatric/Behavioral: Negative for confusion and suicidal ideas.       Physical Exam:  Physical Exam  Constitutional:       General: She is not in acute distress.     Appearance: Normal appearance.   HENT:      Head: Normocephalic and atraumatic.      Nose: Nose normal.      Mouth/Throat:      Mouth: Mucous membranes are moist.   Eyes:      Conjunctiva/sclera: Conjunctivae normal.   Cardiovascular:      Rate and Rhythm: Normal rate and regular rhythm.      Pulses: Normal pulses.      Heart sounds: Normal heart sounds.   Pulmonary:      Effort: Pulmonary effort is normal.      Breath sounds: Normal breath sounds.   Abdominal:      General: Bowel sounds are normal.      Palpations: Abdomen is soft.   Musculoskeletal:         General: Normal range of motion.      Cervical back: Normal range of motion.   Skin:     General: Skin is warm.   Neurological:      General: No focal deficit present.      Mental Status: She is alert and oriented to person, place, and time.   Psychiatric:         Mood and Affect: Mood normal.         Behavior:  Behavior normal.         Thought Content: Thought content normal.         Judgment: Judgment normal.         Recent Image (CT and/or KUB):   CT Abdomen and Pelvis: No results found for this or any previous visit.     CT Stone Protocol: Results for orders placed during the hospital encounter of 04/25/23    CT Abdomen Pelvis Stone Protocol    Narrative  EXAM:  CT Abdomen and Pelvis Without Intravenous Contrast    EXAM DATE:  4/26/2023 10:03 AM    CLINICAL HISTORY:  Nephrolithiasis    TECHNIQUE:  Axial computed tomography images of the abdomen and pelvis without  intravenous contrast.  Sagittal and coronal reformatted images were  created and reviewed.  This CT exam was performed using one or more of  the following dose reduction techniques:  automated exposure control,  adjustment of the mA and/or kV according to patient size, and/or use of  iterative reconstruction technique.    COMPARISON:  04/22/2019    FINDINGS:  Lung bases:  Minimal right basilar atelectasis.  Lung bases are  otherwise clear.    ABDOMEN:  Liver:  Diffuse fatty infiltration of liver.  Gallbladder and bile ducts:  Cholecystectomy.  No ductal dilation.  Pancreas:  Unremarkable.  No ductal dilation.  Spleen:  Unremarkable.  No splenomegaly.  Adrenals:  Unremarkable.  No mass.  Kidneys and ureters:  Nonobstructing bilateral kidney stones.  No  ureteral stones or obstructive uropathy.  Stomach and bowel:  Diverticulosis noted without evidence of acute  diverticulitis.  No obstruction.    PELVIS:  Appendix:  Normal appendix.  Bladder:  Unremarkable.  No stones.  Reproductive:  Unremarkable as visualized.    ABDOMEN and PELVIS:  Intraperitoneal space:  No pneumoperitoneum identified.  No  significant fluid collection.  Bones/joints:  Fixation hardware right hip.  Degenerative changes  lumbar spine.  No acute fracture.  No dislocation.  Soft tissues:  Unremarkable.  Vasculature:  Atherosclerosis aortoiliac vasculature.  No abdominal  aortic  aneurysm.  Lymph nodes:  Unremarkable.  No enlarged lymph nodes.    Impression  1.  Bilateral nonobstructing kidney stones. No obstructive uropathy.  2.  Diverticulosis without diverticulitis.  3.  Fatty infiltration of liver.  4.  Other nonacute and incidental findings detailed above.    This report was finalized on 4/26/2023 10:32 AM by Dr. Kiran Larios MD.     KUB: Results for orders placed during the hospital encounter of 06/26/20    XR Abdomen KUB    Narrative  EXAMINATION: XR ABDOMEN KUB-    CLINICAL INDICATION: sones; N20.0-Calculus of kidney      COMPARISON: 06/08/2020    FINDINGS: One view of the abdomen demonstrates calcifications projecting  over the left abdomen compatible with left-sided renal stones.    Moderate stool is in the colon.    There is arthritic change in the lower lumbar spine.    Impression  Little overall change.    This report was finalized on 6/26/2020 3:04 PM by Dr. Deniz Steele MD.       Labs:  Brief Urine Lab Results  (Last result in the past 365 days)      Color   Clarity   Blood   Leuk Est   Nitrite   Protein   CREAT   Urine HCG        05/17/23 1509 Yellow   Clear   Negative   Negative   Negative   Negative               Office Visit on 05/17/2023   Component Date Value Ref Range Status   • Color 05/17/2023 Yellow  Yellow, Straw, Dark Yellow, Coleen Final   • Clarity, UA 05/17/2023 Clear  Clear Final   • Specific Gravity  05/17/2023 1.025  1.005 - 1.030 Final   • pH, Urine 05/17/2023 6.0  5.0 - 8.0 Final   • Leukocytes 05/17/2023 Negative  Negative Final   • Nitrite, UA 05/17/2023 Negative  Negative Final   • Protein, POC 05/17/2023 Negative  Negative mg/dL Final   • Glucose, UA 05/17/2023 Negative  Negative mg/dL Final   • Ketones, UA 05/17/2023 Negative  Negative Final   • Urobilinogen, UA 05/17/2023 Normal  Normal, 0.2 E.U./dL Final   • Bilirubin 05/17/2023 Negative  Negative Final   • Blood, UA 05/17/2023 Negative  Negative Final   • Lot Number 05/17/2023 [id number]    Final   • Expiration Date 05/17/2023 10/25/24   Final   Admission on 04/25/2023, Discharged on 04/27/2023   Component Date Value Ref Range Status   • Glucose 04/25/2023 228 (H)  65 - 99 mg/dL Final   • BUN 04/25/2023 15  8 - 23 mg/dL Final   • Creatinine 04/25/2023 1.27 (H)  0.57 - 1.00 mg/dL Final   • Sodium 04/25/2023 140  136 - 145 mmol/L Final   • Potassium 04/25/2023 4.8  3.5 - 5.2 mmol/L Final    Slight hemolysis detected by analyzer. Results may be affected.   • Chloride 04/25/2023 104  98 - 107 mmol/L Final   • CO2 04/25/2023 22.1  22.0 - 29.0 mmol/L Final   • Calcium 04/25/2023 9.4  8.6 - 10.5 mg/dL Final   • Total Protein 04/25/2023 7.7  6.0 - 8.5 g/dL Final   • Albumin 04/25/2023 4.1  3.5 - 5.2 g/dL Final   • ALT (SGPT) 04/25/2023 45 (H)  1 - 33 U/L Final   • AST (SGOT) 04/25/2023 80 (H)  1 - 32 U/L Final   • Alkaline Phosphatase 04/25/2023 123 (H)  39 - 117 U/L Final   • Total Bilirubin 04/25/2023 0.8  0.0 - 1.2 mg/dL Final   • Globulin 04/25/2023 3.6  gm/dL Final   • A/G Ratio 04/25/2023 1.1  g/dL Final   • BUN/Creatinine Ratio 04/25/2023 11.8  7.0 - 25.0 Final   • Anion Gap 04/25/2023 13.9  5.0 - 15.0 mmol/L Final   • eGFR 04/25/2023 44.5 (L)  >60.0 mL/min/1.73 Final   • Color, UA 04/25/2023 Yellow  Yellow, Straw Final   • Appearance, UA 04/25/2023 Clear  Clear Final   • pH, UA 04/25/2023 5.5  5.0 - 8.0 Final   • Specific Gravity, UA 04/25/2023 1.017  1.005 - 1.030 Final   • Glucose, UA 04/25/2023 >=1000 mg/dL (3+) (A)  Negative Final   • Ketones, UA 04/25/2023 Negative  Negative Final   • Bilirubin, UA 04/25/2023 Negative  Negative Final   • Blood, UA 04/25/2023 Trace (A)  Negative Final   • Protein, UA 04/25/2023 Negative  Negative Final   • Leuk Esterase, UA 04/25/2023 Trace (A)  Negative Final   • Nitrite, UA 04/25/2023 Negative  Negative Final   • Urobilinogen, UA 04/25/2023 0.2 E.U./dL  0.2 - 1.0 E.U./dL Final   • WBC 04/25/2023 9.15  3.40 - 10.80 10*3/mm3 Final   • RBC 04/25/2023 4.81  3.77 -  5.28 10*6/mm3 Final   • Hemoglobin 04/25/2023 13.6  12.0 - 15.9 g/dL Final   • Hematocrit 04/25/2023 43.3  34.0 - 46.6 % Final   • MCV 04/25/2023 90.0  79.0 - 97.0 fL Final   • MCH 04/25/2023 28.3  26.6 - 33.0 pg Final   • MCHC 04/25/2023 31.4 (L)  31.5 - 35.7 g/dL Final   • RDW 04/25/2023 13.7  12.3 - 15.4 % Final   • RDW-SD 04/25/2023 45.0  37.0 - 54.0 fl Final   • MPV 04/25/2023 9.7  6.0 - 12.0 fL Final   • Platelets 04/25/2023 186  140 - 450 10*3/mm3 Final   • Neutrophil % 04/25/2023 72.4  42.7 - 76.0 % Final   • Lymphocyte % 04/25/2023 16.4 (L)  19.6 - 45.3 % Final   • Monocyte % 04/25/2023 9.8  5.0 - 12.0 % Final   • Eosinophil % 04/25/2023 0.2 (L)  0.3 - 6.2 % Final   • Basophil % 04/25/2023 0.8  0.0 - 1.5 % Final   • Immature Grans % 04/25/2023 0.4  0.0 - 0.5 % Final   • Neutrophils, Absolute 04/25/2023 6.62  1.70 - 7.00 10*3/mm3 Final   • Lymphocytes, Absolute 04/25/2023 1.50  0.70 - 3.10 10*3/mm3 Final   • Monocytes, Absolute 04/25/2023 0.90  0.10 - 0.90 10*3/mm3 Final   • Eosinophils, Absolute 04/25/2023 0.02  0.00 - 0.40 10*3/mm3 Final   • Basophils, Absolute 04/25/2023 0.07  0.00 - 0.20 10*3/mm3 Final   • Immature Grans, Absolute 04/25/2023 0.04  0.00 - 0.05 10*3/mm3 Final   • nRBC 04/25/2023 0.0  0.0 - 0.2 /100 WBC Final   • Glucose 04/25/2023 197 (H)  70 - 130 mg/dL Final    Meter: [id number] : [id number] MAME WEBBER   • COVID19 04/25/2023 Not Detected  Not Detected - Ref. Range Final   • Influenza A PCR 04/25/2023 Not Detected  Not Detected Final   • Influenza B PCR 04/25/2023 Not Detected  Not Detected Final   • RBC, UA 04/25/2023 3-5 (A)  None Seen, 0-2 /HPF Final   • WBC, UA 04/25/2023 6-12 (A)  None Seen, 0-2 /HPF Final   • Bacteria, UA 04/25/2023 None Seen  None Seen /HPF Final   • Squamous Epithelial Cells, UA 04/25/2023 0-2  None Seen, 0-2 /HPF Final   • Hyaline Casts, UA 04/25/2023 None Seen  None Seen /LPF Final   • Methodology 04/25/2023 Automated Microscopy   Final   • Urine Culture  04/25/2023 25,000 CFU/mL Mixed Neha Isolated   Final   • QT Interval 04/25/2023 414  ms Final   • QTC Interval 04/25/2023 465  ms Final   • Total Cholesterol 04/25/2023 192  0 - 200 mg/dL Final   • Triglycerides 04/25/2023 168 (H)  0 - 150 mg/dL Final   • HDL Cholesterol 04/25/2023 54  40 - 60 mg/dL Final   • LDL Cholesterol  04/25/2023 109 (H)  0 - 100 mg/dL Final   • VLDL Cholesterol 04/25/2023 29  5 - 40 mg/dL Final   • LDL/HDL Ratio 04/25/2023 1.93   Final   • Hemoglobin A1C 04/25/2023 7.50 (H)  4.80 - 5.60 % Final   • Magnesium 04/25/2023 1.7  1.6 - 2.4 mg/dL Final   • Blood Culture 04/25/2023 No growth at 5 days   Final   • Blood Culture 04/25/2023 No growth at 5 days   Final   • Glucose 04/25/2023 197 (H)  70 - 130 mg/dL Final    Meter: [id number] : [id number] MAME WEBBER   • Glucose 04/25/2023 215 (H)  70 - 130 mg/dL Final    Meter: [id number] : [id number] OLGA SALAZAR   • WBC 04/26/2023 7.65  3.40 - 10.80 10*3/mm3 Final   • RBC 04/26/2023 4.04  3.77 - 5.28 10*6/mm3 Final   • Hemoglobin 04/26/2023 11.6 (L)  12.0 - 15.9 g/dL Final   • Hematocrit 04/26/2023 37.7  34.0 - 46.6 % Final   • MCV 04/26/2023 93.3  79.0 - 97.0 fL Final   • MCH 04/26/2023 28.7  26.6 - 33.0 pg Final   • MCHC 04/26/2023 30.8 (L)  31.5 - 35.7 g/dL Final   • RDW 04/26/2023 13.9  12.3 - 15.4 % Final   • RDW-SD 04/26/2023 48.2  37.0 - 54.0 fl Final   • MPV 04/26/2023 10.2  6.0 - 12.0 fL Final   • Platelets 04/26/2023 157  140 - 450 10*3/mm3 Final   • Glucose 04/26/2023 241 (H)  65 - 99 mg/dL Final   • BUN 04/26/2023 20  8 - 23 mg/dL Final   • Creatinine 04/26/2023 1.28 (H)  0.57 - 1.00 mg/dL Final   • Sodium 04/26/2023 140  136 - 145 mmol/L Final   • Potassium 04/26/2023 4.7  3.5 - 5.2 mmol/L Final   • Chloride 04/26/2023 105  98 - 107 mmol/L Final   • CO2 04/26/2023 23.6  22.0 - 29.0 mmol/L Final   • Calcium 04/26/2023 8.4 (L)  8.6 - 10.5 mg/dL Final   • Total Protein 04/26/2023 6.4  6.0 - 8.5 g/dL Final   • Albumin 04/26/2023 3.6  3.5  - 5.2 g/dL Final   • ALT (SGPT) 04/26/2023 40 (H)  1 - 33 U/L Final   • AST (SGOT) 04/26/2023 49 (H)  1 - 32 U/L Final   • Alkaline Phosphatase 04/26/2023 100  39 - 117 U/L Final   • Total Bilirubin 04/26/2023 0.5  0.0 - 1.2 mg/dL Final   • Globulin 04/26/2023 2.8  gm/dL Final   • A/G Ratio 04/26/2023 1.3  g/dL Final   • BUN/Creatinine Ratio 04/26/2023 15.6  7.0 - 25.0 Final   • Anion Gap 04/26/2023 11.4  5.0 - 15.0 mmol/L Final   • eGFR 04/26/2023 44.1 (L)  >60.0 mL/min/1.73 Final   • Glucose 04/26/2023 146 (H)  70 - 130 mg/dL Final    Meter: [id number] : [id number] ZAHRAA ESTEVEZ   • Glucose 04/26/2023 168 (H)  70 - 130 mg/dL Final    Meter: [id number] : [id number] DON ABRAHAM   • Glucose 04/26/2023 178 (H)  70 - 130 mg/dL Final    Meter: [id number] : [id number] ZAHRAA ESTEVEZ   • Glucose 04/26/2023 184 (H)  70 - 130 mg/dL Final    RN Notified Meter: [id number] : [id number] ZAHRAA ASHLEY   • Glucose 04/27/2023 166 (H)  70 - 130 mg/dL Final    Meter: [id number] : [id number] DON ABRAHAM   • Glucose 04/27/2023 189 (H)  65 - 99 mg/dL Final   • BUN 04/27/2023 13  8 - 23 mg/dL Final   • Creatinine 04/27/2023 1.06 (H)  0.57 - 1.00 mg/dL Final   • Sodium 04/27/2023 142  136 - 145 mmol/L Final   • Potassium 04/27/2023 4.2  3.5 - 5.2 mmol/L Final   • Chloride 04/27/2023 110 (H)  98 - 107 mmol/L Final   • CO2 04/27/2023 24.3  22.0 - 29.0 mmol/L Final   • Calcium 04/27/2023 8.4 (L)  8.6 - 10.5 mg/dL Final   • BUN/Creatinine Ratio 04/27/2023 12.3  7.0 - 25.0 Final   • Anion Gap 04/27/2023 7.7  5.0 - 15.0 mmol/L Final   • eGFR 04/27/2023 55.2 (L)  >60.0 mL/min/1.73 Final   • Color, UA 04/27/2023 Yellow  Yellow, Straw Final   • Appearance, UA 04/27/2023 Clear  Clear Final   • pH, UA 04/27/2023 5.5  5.0 - 8.0 Final   • Specific Gravity, UA 04/27/2023 1.009  1.005 - 1.030 Final   • Glucose, UA 04/27/2023 500 mg/dL (2+) (A)  Negative Final   • Ketones, UA 04/27/2023 Negative  Negative Final   •  Bilirubin, UA 04/27/2023 Negative  Negative Final   • Blood, UA 04/27/2023 Trace (A)  Negative Final   • Protein, UA 04/27/2023 Negative  Negative Final   • Leuk Esterase, UA 04/27/2023 Small (1+) (A)  Negative Final   • Nitrite, UA 04/27/2023 Negative  Negative Final   • Urobilinogen, UA 04/27/2023 0.2 E.U./dL  0.2 - 1.0 E.U./dL Final   • RBC, UA 04/27/2023 0-2  None Seen, 0-2 /HPF Final   • WBC, UA 04/27/2023 13-20 (A)  None Seen, 0-2 /HPF Final   • Bacteria, UA 04/27/2023 None Seen  None Seen /HPF Final   • Squamous Epithelial Cells, UA 04/27/2023 0-2  None Seen, 0-2 /HPF Final   • Hyaline Casts, UA 04/27/2023 None Seen  None Seen /LPF Final   • Methodology 04/27/2023 Automated Microscopy   Final   • Urine Culture 04/27/2023 No growth   Final        Procedure: None  Procedures     I have reviewed and agree with the above PMH, PSH, FMH, social history, medications, allergies, and labs.      Assessment/Plan:   Problem List Items Addressed This Visit        Genitourinary and Reproductive     Nephrolithiasis    Relevant Medications    tamsulosin (FLOMAX) 0.4 MG capsule 24 hr capsule   Other Visit Diagnoses     Frequency of urination    -  Primary    Relevant Orders    POC Urinalysis Dipstick, Automated (Completed)          Health Maintenance:   Health Maintenance Due   Topic Date Due   • URINE MICROALBUMIN  Never done   • Pneumococcal Vaccine 65+ (1 - PCV) Never done   • TDAP/TD VACCINES (1 - Tdap) Never done   • ZOSTER VACCINE (1 of 2) Never done   • DIABETIC FOOT EXAM  Never done   • ANNUAL WELLNESS VISIT  05/03/2020   • DXA SCAN  01/31/2022   • COVID-19 Vaccine (4 - Booster for Moderna series) 02/07/2022   • DIABETIC EYE EXAM  05/12/2023        Smoking Counseling: Former smoker.  Never used smokeless tobacco.    Urine Incontinence: Patient reports that she is not currently experiencing any symptoms of urinary incontinence.    Patient was given instructions and counseling regarding her condition or for health  maintenance advice. Please see specific information pulled into the AVS if appropriate.    Patient Education:   Nephrolithiasis - patient has a past medical history of nephrolithiasis and was admitted to our hospital for obstructive uropathy of the right ureter.  She had passed the stone voluntarily just prior to surgery and she now only has small nonobstructing bilateral intrarenal calculi less than 2 mm in size.  Discussed this with the patient advised her that she will pass the spontaneously with no acute problems.  I did advise patient to decrease the intake of any contributing factors to stone formation such as soda and sweet or unsweet tea.  Advised patient increase water intake to 2 to 3 L/day.  I will give the patient Flomax to have as needed if she feels she is passing another stone.  At this time she is doing well and has no complaints and we can see her back on a yearly basis for monitorization of stones.  Patient verbalized understanding and agreed to plan of care.    Visit Diagnoses:    ICD-10-CM ICD-9-CM   1. Frequency of urination  R35.0 788.41   2. Nephrolithiasis  N20.0 592.0       Meds Ordered During Visit:  New Medications Ordered This Visit   Medications   • tamsulosin (FLOMAX) 0.4 MG capsule 24 hr capsule     Sig: Take 1 capsule by mouth Daily.     Dispense:  30 capsule     Refill:  0       Follow Up Appointment: 1 year  No follow-ups on file.      This document has been electronically signed by Eduard Maxwell PA-C   May 18, 2023 09:26 EDT    Part of this note may be an electronic transcription/translation of spoken language to printed text using the Dragon Dictation System.

## 2023-11-08 NOTE — H&P
[facility] Medicine Services  HISTORY & PHYSICAL    Patient Identification:  Name:  Laxmi Higginbotham  Age:  69 y.o.  Sex:  female  :  1950  MRN:  [id number]   Visit Number:  [id number]  Primary Care Physician:  Amanda Pozo MD     Subjective     Chief complaint:   Abdominal pain     History of presenting illness:   Patient is a 69 y.o. female with past medical history significant for non insulin dependent type II diabetes mellitus, hyperlipidemia, GERD, breast cancer s/p right sided lumpectomy in the past, and history of nephrolithiasis s/p lithotripsy in the past that was transferred from OS ([facility] ED) for evaluation of abdominal pain.  Work-up in the ED at [facility] revealed bilateral nephrolithiasis, ureterolithiasis, and right sided moderate hydronephrosis as well as acute kidney injury (please see transfer documents for details, labs/imaging results summarized below). Patient was transferred for urological evaluation and possible surgical intervention, [facility] MD did discuss with Dr. Rendon with urology who recommended transfer.      Patient states onset of symptoms was approximately 1 AM  morning. Patient states she was woke from sleep secondary to acute onset right lower quadrant abdominal pain. Patient states she was doing well prior to onset with no issues. She reports severe pain, radiating into her right flank and epigastric area. She reports associated nausea and emesis. She denies similar symptoms in the past, symptoms not similar to renal stones in the past. She denies any fevers but does report intermittent chills. Reports decreased appetite due to nausea secondary to pain. She denies any dysuria, hematuria, frequency, or urgency. She denies any chest pain or dyspnea.     Patient has been admitted to the medical surgical floor for further evaluation and treatment.     Present during exam: ROSAMARIA Ahumada    Vitals [facility] (OSH):  · Temperature 99.1, heart  rate 79, respiratory rate 20, blood pressure 152/88, oxygen saturation 97% on room air.    Labs [facility] (OSH):  · CBC: White blood cell count 13.6, hematocrit 40.8, white blood cell count 9.31, platelets 175  · CMP: sodium 139, potassium 4.0, chloride 103, CO2 24.9, creatinine 2.67, BUN 33, glucose 191  · Lipase 826  · Urinalysis: Straw-colored urine, 3+ glucose, negative ketones, 1+ blood, otherwise grossly unremarkable  Meds [facility] (OSH):  · Normal saline 1 liter bolus   · 4 mg IV morphine sulfate   ·   Radiological Imaging from [facility] (OSH):  CT abd/pelv without contrast 6/7/2020:  · Bilateral nephrolithiasis, moderate right-sided hydronephrosis secondary to 2 separate distal ureteral calculi proximately 4 mm in diameter.  There is an additional 4 mm calculus noted in the proximal left ureter.   · Impression: Bilateral nephrolithiasis, bilateral ureterolithiasis, moderate right hydronephrosis  ---------------------------------------------------------------------------------------------------------------------   Review of Systems   Constitutional: Positive for appetite change and chills. Negative for activity change, diaphoresis, fatigue and fever.   HENT: Negative for congestion, postnasal drip, rhinorrhea, sinus pain, sore throat and trouble swallowing.    Eyes: Negative for discharge and visual disturbance.   Respiratory: Negative for cough, chest tightness, shortness of breath and wheezing.    Cardiovascular: Negative for chest pain, palpitations and leg swelling.   Gastrointestinal: Positive for abdominal pain, nausea and vomiting. Negative for constipation and diarrhea.   Endocrine: Negative for cold intolerance and heat intolerance.   Genitourinary: Positive for flank pain (Right ). Negative for decreased urine volume, dysuria, frequency and urgency.   Musculoskeletal: Negative for arthralgias, gait problem and myalgias.   Skin: Negative for color change, rash and wound.   Allergic/Immunologic: Negative for  99 environmental allergies and immunocompromised state.   Neurological: Negative for dizziness, syncope, weakness, light-headedness and headaches.   Hematological: Negative for adenopathy. Does not bruise/bleed easily.   Psychiatric/Behavioral: Negative for confusion and decreased concentration. The patient is not nervous/anxious.       ---------------------------------------------------------------------------------------------------------------------   Past Medical History:   Diagnosis Date   • Breast cancer (CMS/HCC)     right breast   • Diabetes mellitus (CMS/HCC)    • Elevated cholesterol    • GERD (gastroesophageal reflux disease)    • H/O seasonal allergies    • High cholesterol    • Kidney stone    • Migraines    • Osteoarthritis    • Osteoporosis      Past Surgical History:   Procedure Laterality Date   • BREAST LUMPECTOMY      RIGHT   • COLONOSCOPY     • EXTRACORPOREAL SHOCK WAVE LITHOTRIPSY (ESWL) Right 5/17/2019    Procedure: EXTRACORPOREAL SHOCKWAVE LITHOTRIPSY;  Surgeon: Darrick Alexis MD;  Location: [facility];  Service: Urology   • GALLBLADDER SURGERY     • TUBAL ABDOMINAL LIGATION       Family History   Problem Relation Age of Onset   • Heart disease Father    • Cancer Mother      Social History     Socioeconomic History   • Marital status:      Spouse name: Not on file   • Number of children: Not on file   • Years of education: Not on file   • Highest education level: Not on file   Tobacco Use   • Smoking status: Former Smoker     Packs/day: 1.00     Years: 15.00     Pack years: 15.00     Types: Cigarettes   • Smokeless tobacco: Never Used   Substance and Sexual Activity   • Alcohol use: No   • Drug use: No   • Sexual activity: Defer     ---------------------------------------------------------------------------------------------------------------------   Allergies:  Patient has no known  allergies.  ---------------------------------------------------------------------------------------------------------------------   Medications below are reported home medications pulling from within the system; at this time, these medications have not been reconciled unless otherwise specified and are in the verification process for further verifcation as current home medications.    Prior to Admission Medications     Prescriptions Last Dose Informant Patient Reported? Taking?    Alendronate Sodium (FOSAMAX PO)  Self Yes No    Take 70 mg by mouth 1 (One) Time Per Week.    aspirin-acetaminophen-caffeine (EXCEDRIN MIGRAINE) 250-250-65 MG per tablet   Yes No    Take 1 tablet by mouth Every 6 (Six) Hours As Needed for Headache.    atorvastatin (LIPITOR) 20 MG tablet  Self Yes No    Take 40 mg by mouth Daily.    CALCIUM-VITAMIN D PO  Self Yes No    Take 600 mg by mouth Daily.    cetirizine (zyrTEC) 10 MG tablet  Self Yes No    Take 10 mg by mouth Daily.    dapagliflozin (FARXIGA) 5 MG tablet tablet  Self Yes No    Take 5 mg by mouth Daily.    DICLOFENAC SODIUM PO  Self Yes No    Take 75 mg by mouth 2 (Two) Times a Day.    fluticasone (FLONASE) 50 MCG/ACT nasal spray  Self Yes No    2 sprays into the nostril(s) as directed by provider Daily.    HYDROcodone-acetaminophen (NORCO)  MG per tablet   No No    Take 1 tablet by mouth Every 4 (Four) Hours As Needed for Moderate Pain  (Pain).    montelukast (SINGULAIR) 10 MG tablet   Yes No    Take 10 mg by mouth Every Night.    pantoprazole (PROTONIX) 40 MG EC tablet   Yes No    Take 40 mg by mouth Daily.    polyethylene glycol (MIRALAX) packet  Self Yes No    Take 17 g by mouth Daily.    rizatriptan (MAXALT) 10 MG tablet   Yes No    Take 10 mg by mouth 1 (One) Time As Needed for Migraine. May repeat in 2 hours if needed    SITagliptin (JANUVIA) 100 MG tablet   Yes No    Take 100 mg by mouth Daily.    topiramate (TOPAMAX) 50 MG tablet  Self Yes No    Take 50 mg by mouth 2  (Two) Times a Day.        ---------------------------------------------------------------------------------------------------------------------    Objective     Hospital Scheduled Meds:    heparin (porcine) 5,000 Units Subcutaneous Q12H   insulin aspart 0-7 Units Subcutaneous TID AC   pantoprazole 40 mg Oral Q AM   sodium chloride 10 mL Intravenous Q12H       sodium chloride 75 mL/hr Last Rate: 75 mL/hr (06/08/20 0217)     Current listed hospital scheduled medications may not yet reflect those currently placed in orders that are signed and held, awaiting patient's arrival to floor/unit.    ---------------------------------------------------------------------------------------------------------------------   Vital Signs:  Temp:  [97.5 °F (36.4 °C)] 97.5 °F (36.4 °C)  Heart Rate:  [89] 89  Resp:  [18] 18  BP: (164)/(94) 164/94    SpO2 Percentage    06/08/20 0035   SpO2: 95%     SpO2:  [95 %] 95 %  on   ;   Device (Oxygen Therapy): room air    Body mass index is 26.28 kg/m².  Wt Readings from Last 3 Encounters:   06/08/20 69.4 kg (153 lb 1.6 oz)   05/17/19 74.3 kg (163 lb 12.8 oz)   04/23/19 70.3 kg (155 lb)       ---------------------------------------------------------------------------------------------------------------------   Physical Exam:  Physical Exam   Constitutional: She is oriented to person, place, and time. She appears well-developed and well-nourished.  Non-toxic appearance. No distress.   Pleasant, resting in bed, no acute distress noted.    HENT:   Head: Normocephalic and atraumatic.   Right Ear: External ear normal.   Left Ear: External ear normal.   Nose: Nose normal.   Mouth/Throat: Oropharynx is clear and moist and mucous membranes are normal. No oropharyngeal exudate.   Eyes: Pupils are equal, round, and reactive to light. Conjunctivae and EOM are normal. No scleral icterus.   Neck: Trachea normal and normal range of motion. Neck supple. No JVD present. No muscular tenderness present. Carotid  bruit is not present. No tracheal deviation present. No thyromegaly present.   Cardiovascular: Normal rate, regular rhythm, normal heart sounds and intact distal pulses. Exam reveals no gallop and no friction rub.   No murmur heard.  Pulses:       Dorsalis pedis pulses are 2+ on the right side, and 2+ on the left side.        Posterior tibial pulses are 2+ on the right side, and 2+ on the left side.   Pulmonary/Chest: Effort normal and breath sounds normal. No respiratory distress. She has no wheezes. She has no rhonchi. She has no rales. She exhibits no tenderness.   Abdominal: Soft. Bowel sounds are normal. She exhibits no distension and no mass. There is no hepatosplenomegaly. There is tenderness in the epigastric area and suprapubic area. There is CVA tenderness (Right ). There is no rebound, no guarding and negative Michelle's sign. No hernia.   No peritoneal signs   Genitourinary:   Genitourinary Comments: No gatica catheter in place.    Musculoskeletal: She exhibits no edema, tenderness or deformity.        Right lower leg: She exhibits no edema.        Left lower leg: She exhibits no edema.     Vascular Status -  Her right foot exhibits normal foot vasculature  and no edema. Her left foot exhibits normal foot vasculature  and no edema.  Neurological: She is alert and oriented to person, place, and time. She has normal strength. No cranial nerve deficit or sensory deficit.   Awake and alert. Follows commands. Answers questions appropriately. Moves all extremities equally. Strength and sensation intact. No focal neuro deficits on exam.    Skin: Skin is warm and dry. Capillary refill takes less than 2 seconds. No rash noted. No erythema. No pallor.   Psychiatric: She has a normal mood and affect. Her speech is normal and behavior is normal. Judgment and thought content normal. Cognition and memory are normal.   Nursing note and vitals  reviewed.    ---------------------------------------------------------------------------------------------------------------------  EKG:  Pending cardiology read, no previous EKG to compare to on file. NSR 75 bpm, QTc slightly prolonged at 484 m/s    Telemetry:    Sinus 70s    I have personally reviewed the EKG/Telemetry strip  ---------------------------------------------------------------------------------------------------------------------         Results from last 7 days   Lab Units 06/08/20  0110   CHOLESTEROL mg/dL 175   TRIGLYCERIDES mg/dL 178*   HDL CHOL mg/dL 47   LDL CHOL mg/dL 92       Results from last 7 days   Lab Units 06/08/20  0110   CRP mg/dL 1.42*   LACTATE mmol/L 1.4   WBC 10*3/mm3 8.29   HEMOGLOBIN g/dL 13.1   HEMATOCRIT % 42.6   MCV fL 94.0   MCHC g/dL 30.8*   PLATELETS 10*3/mm3 167     Results from last 7 days   Lab Units 06/08/20  0110   SODIUM mmol/L 137   POTASSIUM mmol/L 4.3   MAGNESIUM mg/dL 1.8   CHLORIDE mmol/L 105   CO2 mmol/L 22.4   BUN mg/dL 28*   CREATININE mg/dL 2.36*   EGFR IF NONAFRICN AM mL/min/1.73 20*   CALCIUM mg/dL 9.3   GLUCOSE mg/dL 165*   ALBUMIN g/dL 3.80   BILIRUBIN mg/dL 1.4*   ALK PHOS U/L 141*   AST (SGOT) U/L 133*   ALT (SGPT) U/L 81*   Estimated Creatinine Clearance: 20.9 mL/min (A) (by C-G formula based on SCr of 2.36 mg/dL (H)).    No results found for: HGBA1C, POCGLU  Lab Results   Component Value Date    TSH 2.340 06/08/2020     Microbiology Results (last 10 days)     ** No results found for the last 240 hours. **           I have personally reviewed the above laboratory results.   ---------------------------------------------------------------------------------------------------------------------  Imaging Results (Last 7 Days)     ** No results found for the last 168 hours. **        I have personally reviewed the above radiology results.    ---------------------------------------------------------------------------------------------------------------------    Assessment & Plan      Active Hospital Problems    Diagnosis POA   • **Renal colic [N23] Yes   • Type 2 diabetes mellitus, without long-term current use of insulin (CMS/[facility]) [E11.9] Yes   • GERD (gastroesophageal reflux disease) [K21.9] Yes   • Hyperlipidemia [E78.5] Yes   • History of nephrolithiasis [Z87.442] Yes   • History of breast cancer s/p lumpectomy in the past  [Z85.3] Not Applicable   • Former smoker [Z87.891] Not Applicable   • JAN (acute kidney injury) (CMS/[facility]) [N17.9] Yes   • Hydronephrosis with urinary obstruction due to renal calculus [N13.2] Yes   • Ureterolithiasis [N20.1] Yes   • Nephrolithiasis [N20.0] Yes     · Bilateral nephrolithiasis and ureterolithiasis with moderate right sided hydronephrosis causing obstructive uropathy: CT evidence of obstruction. Gentle IV fluids. Strain urine. Monitor urine output. Urology consult, appreciate input/assistance. NPO.   · Acute kidney injury vs acute on chronic renal insufficiency, 2/2 obstructive uropathy: Unknown baseline creatinine, will obtain previous labs from patient's PCP office. Likely obstructive in nature. Cont gentle IV fluids. Avoid nephrotoxins as much as possible. Urology consult for obstructive uropathy. Monitor urine output, repeat chemistry panel in AM.   · Elevated LFTs: Obtain viral hepatitis panel. No previous labs to compare to. Hold home statin for now. Liver homogenous on OSH CT scan. Avoid hepatotoxins as much as possible. Repeat chemistry panel in AM.   · Elevated Lipase at OSH: Pt with mild epigastric pain and elevated lipase. Repeat lipase pending. Previous cholecystomy.   · Non insulin dependent type II diabetes mellitus: Obtain HgbA1C. Hold oral DM meds. Start low dose SSI, titrate dosage as necessary. Closely monitor blood glucose levels with accuchecks. Hypoglycemia protocol in place as necessary.    · Hyperlipidemia: Hold home statin d/t elevated LFTs. AM lipid panel.   · GERD: PPI   · History of breast cancer, s/p right sided lumpectomy in the past: Cont outpatient monitoring and follow up   · Prolonged QTc: Obtain mag level. Tele monitoring. Avoid prolonging agents as much as possible.   · Former smoker   · F/E/N: Gentle fluids. Replace electrolytes per protocol as necessary. NPO diet.     ---------------------------------------------------  DVT Prophylaxis: SQ heparin   GI Prophylaxis: PPI   Activity: Up with assistance as tolerated     The patient is considered to be a high risk patient due to: Renal colic, hx of nephrolithiasis requiring lithotripsy    INPATIENT status due to the need for care which can only be reasonably provided in an hospital setting such as aggressive/expedited ancillary services and/or consultation services, the necessity for IV medications, close physician monitoring and/or the possible need for procedures.  In such, I feel patient’s risk for adverse outcomes and need for care warrant INPATIENT evaluation and predict the patient’s care encounter to likely last beyond 2 midnights.    Code Status: FULL CODE     I have discussed the patient's assessment and plan with the patient, nursing staff, and attending physician.       Beckie Jacob PA-C  Hospitalist Service -- [facility]   Pager: [phone]    06/08/20  02:31    Attending Physician: Helio Miguel MD        ---------------------------------------------------------------------------------------------------------------------

## 2023-11-22 ENCOUNTER — TRANSCRIBE ORDERS (OUTPATIENT)
Dept: ADMINISTRATIVE | Facility: HOSPITAL | Age: 73
End: 2023-11-22
Payer: MEDICARE

## 2023-11-22 ENCOUNTER — LAB (OUTPATIENT)
Dept: LAB | Facility: HOSPITAL | Age: 73
End: 2023-11-22
Payer: MEDICARE

## 2023-11-22 DIAGNOSIS — N20.0 KIDNEY STONE: Primary | ICD-10-CM

## 2023-11-22 DIAGNOSIS — N20.0 KIDNEY STONE: ICD-10-CM

## 2023-11-22 PROCEDURE — 82365 CALCULUS SPECTROSCOPY: CPT

## 2023-12-01 LAB
CALCIUM OXALATE DIHYDRATE MFR STONE IR: 20 %
COLOR STONE: NORMAL
COM MFR STONE: 80 %
COMPN STONE: NORMAL
LABORATORY COMMENT REPORT: NORMAL
Lab: NORMAL
Lab: NORMAL
PHOTO: NORMAL
SIZE STONE: NORMAL MM
SPEC SOURCE SUBJ: NORMAL
WT STONE: 50 MG

## (undated) DEVICE — NITINOL STONE RETRIEVAL BASKET: Brand: ZERO TIP

## (undated) DEVICE — GLW STD STR 3CM .035X150CM

## (undated) DEVICE — GLV SURG PREMIERPRO MIC LTX PF SZ8 BRN

## (undated) DEVICE — COR CYSTO: Brand: MEDLINE INDUSTRIES, INC.

## (undated) DEVICE — GOWN,REINF,POLY,ECL,PP SLV,XXL: Brand: MEDLINE

## (undated) DEVICE — SYS IRR PUMP SGL ACTN VAC SYR 10CC

## (undated) DEVICE — FIBR LASR HOLMIUM SLIMLINE SIS EZ 365U